# Patient Record
Sex: FEMALE | Race: WHITE | Employment: FULL TIME | URBAN - METROPOLITAN AREA
[De-identification: names, ages, dates, MRNs, and addresses within clinical notes are randomized per-mention and may not be internally consistent; named-entity substitution may affect disease eponyms.]

---

## 2017-02-13 ENCOUNTER — DOCUMENTATION ONLY (OUTPATIENT)
Dept: NEUROLOGY | Age: 42
End: 2017-02-13

## 2017-02-13 ENCOUNTER — OFFICE VISIT (OUTPATIENT)
Dept: NEUROLOGY | Age: 42
End: 2017-02-13

## 2017-02-13 VITALS
HEIGHT: 61 IN | WEIGHT: 196 LBS | DIASTOLIC BLOOD PRESSURE: 80 MMHG | BODY MASS INDEX: 37 KG/M2 | SYSTOLIC BLOOD PRESSURE: 132 MMHG | OXYGEN SATURATION: 98 % | HEART RATE: 87 BPM

## 2017-02-13 DIAGNOSIS — R20.0 LEFT ARM NUMBNESS: ICD-10-CM

## 2017-02-13 DIAGNOSIS — M54.2 NECK PAIN: ICD-10-CM

## 2017-02-13 DIAGNOSIS — G43.829 MENSTRUAL MIGRAINE WITHOUT STATUS MIGRAINOSUS, NOT INTRACTABLE: Primary | ICD-10-CM

## 2017-02-13 RX ORDER — LORAZEPAM 1 MG/1
1 TABLET ORAL ONCE
Qty: 2 TAB | Refills: 0 | Status: SHIPPED | OUTPATIENT
Start: 2017-02-13 | End: 2017-02-13

## 2017-02-13 RX ORDER — SUMATRIPTAN 50 MG/1
50 TABLET, FILM COATED ORAL
COMMUNITY

## 2017-02-13 NOTE — PATIENT INSTRUCTIONS
[facility]   Statement to Patients  April 1, 2014      In an effort to ensure the large volume of patient prescription refills is processed in the most efficient and expeditious manner, we are asking our patients to assist us by calling your Pharmacy for all prescription refills, this will include also your  Mail Order Pharmacy. The pharmacy will contact our office electronically to continue the refill process. Please do not wait until the last minute to call your pharmacy. We need at least 48 hours (2days) to fill prescriptions. We also encourage you to call your pharmacy before going to  your prescription to make sure it is ready. With regard to controlled substance prescription refill requests (narcotic refills) that need to be picked up at our office, we ask your cooperation by providing us with at least 72 hours (3days) notice that you will need a refill. We will not refill narcotic prescription refill requests after 4:00pm on any weekday, Monday through Thursday, or after 2:00pm on Fridays, or on the weekends. We encourage everyone to explore another way of getting your prescription refill request processed using [employer], our patient web portal through our electronic medical record system. [employer] is an efficient and effective way to communicate your medication request directly to the office and  downloadable as an johanna on your smart phone . [employer] also features a review functionality that allows you to view your medication list as well as leave messages for your physician. Are you ready to get connected? If so please review the attatched instructions or speak to any of our staff to get you set up right away! Thank you so much for your cooperation. Should you have any questions please contact our Practice Administrator.     The Physicians and Staff,  [facility]

## 2017-02-13 NOTE — LETTER
Dear Theresa Dong MD, Thank you for allowing me to see your patient, Rocael Rapp for a neurological consultation. Please see my impression and recommendations as outlined in my note. Sincerely, Gideon Paez MD 
[facility] at [address] 
 
REFERRED BY: 
Theresa Dong MD 
 
CHIEF COMPLAINT: 
Migraines, left arm numbness, memory issues, anxiety HISTORY OF PRESENT ILLNESS HISTORY PROVIDED BY: 
Patient Rocael Rapp is a 42 y.o. female who I am asked to see in consultation for migraines, left arm numbness, memory issues, and anxiety. She started with migraines in 2008. Two times in the last two months she had left arm numbness and some chest pain for about an hour. No inciting factor for these. PCP referred her here. She notes several times lately she is more anxious when she is in the city[place] She had an episode of confusion when driving. Anxiety is just situational. It is not an everyday occurrence. She was pregnant with her second daughter when she developed migraine. She now notes they are around her period. Migraines are occurring more often. Headache Characterization: throbbing Pain Level: 10/10 Aura: no 
Frequency/Length: 4 per month and stops with meds Location: left side Nausea/Vomiting: N Photophobia: Y Phonophobia: Y Provoking factors: none Relieving factors: lies down, and takes meds Focal neurologic symptoms with headache: no 
 
Meds: 
Prior abortive tx: imitrex Prior preventative tx: none Current abortive tx: sumatriptan 50 mg x 1 and with advil Current preventative tx: none Family Hx of headaches/migraines: no 
 
Social: 
Work:  for [employer] and has stress with this Caffeine:drinks same amount daily Tobacco use: less than pack per day (quit three years and still had them) Sleep habits: sleeps well (averages 7 hours) Exercise: none FH of grandmother with strokes. No history of CT or MRI brain done. She will get massages about once a month. PMH Past Medical History Diagnosis Date  Headache  Liver tumor (benign) 2003 [address] Social History Social History  Marital status:  Spouse name: N/A  
 Number of children: N/A  
 Years of education: N/A Occupational History   Social History Main Topics  Smoking status: Former Smoker Quit date: 5/10/2008  Smokeless tobacco: Never Used  Alcohol use Yes Comment: rarely  Drug use: No  
 Sexual activity: Yes  
  Partners: Male Birth control/ protection: Spermicide Other Topics Concern  None Social History Narrative [facility] Family History Problem Relation Age of Onset  Thyroid Disease Mother  Thyroid Disease Sister ALLERGIES Allergies Allergen Reactions  Sulfa (Sulfonamide Antibiotics) Hives CURRENT MEDS Current Outpatient Prescriptions Medication Sig Dispense Refill  SUMAtriptan (IMITREX) 50 mg tablet Take 50 mg by mouth once as needed for Migraine.  phentermine (ADIPEX-P) 37.5 mg tablet Take 1 Tab by mouth every morning. Take between 7 and 9 am daily. 30 Tab 1  
 guaiFENesin SR (MUCINEX) 600 mg SR tablet Take 1 Tab by mouth two (2) times a day. REVIEW OF SYSTEMS:  
 
Y  N       Y  N  Y  N   Y  N 
  AIDS            Falls    Memory Loss     Shortness of breath Anxiety            Fatigue   Muscle Pain           Skipped beats Chest Pain     Frequent HA   Ms Weakness        Snoring Constipation  Hearing loss   Nause/Vomiting     Stomach Pain Cough           Hepatitis   Neuropathy            Swallowing difficulty Depression   Incontinence   Poor appetite         Vertigo Diarrhea         Joint Pain   Rash                      Visual disturbances Fainting          Leg Swelling   Ringing ears          Weight changes Unable to obtain  ROS due to  mental status change  sedated   intubated PREVIOUS WORKUP IMAGING: None LABS Results for orders placed or performed in visit on 01/14/13 LIPID PANEL Result Value Ref Range Cholesterol, total 156 100 - 199 mg/dL Triglyceride 210 (H) 0 - 149 mg/dL HDL Cholesterol 38 (L) >39 mg/dL VLDL, calculated 42 (H) 5 - 40 mg/dL LDL, calculated 76 0 - 99 mg/dL METABOLIC PANEL, COMPREHENSIVE Result Value Ref Range Glucose 80 65 - 99 mg/dL BUN 12 6 - 20 mg/dL Creatinine 0.68 0.57 - 1.00 mg/dL GFR est non- >59 mL/min/1.73  
 eGFR If  128 >59 mL/min/1.73  
 BUN/Creatinine ratio 18 8 - 20 Sodium 139 134 - 144 mmol/L Potassium 4.0 3.5 - 5.2 mmol/L Chloride 103 97 - 108 mmol/L  
 CO2 21 20 - 32 mmol/L Calcium 9.4 8.7 - 10.2 mg/dL Protein, total 7.3 6.0 - 8.5 g/dL Albumin 4.6 3.5 - 5.5 g/dL GLOBULIN, TOTAL 2.7 1.5 - 4.5 g/dL A-G Ratio 1.7 1.1 - 2.5 Bilirubin, total 0.3 0.0 - 1.2 mg/dL Alk. phosphatase 54 25 - 150 IU/L  
 AST (SGOT) 12 0 - 40 IU/L  
 ALT (SGPT) 13 0 - 32 IU/L  
CBC W/O DIFF Result Value Ref Range WBC 8.7 4.0 - 10.5 x10E3/uL  
 RBC 5.04 3.77 - 5.28 x10E6/uL HGB 15.3 11.1 - 15.9 g/dL HCT 45.3 34.0 - 46.6 % MCV 90 79 - 97 fL  
 MCH 30.4 26.6 - 33.0 pg  
 MCHC 33.8 31.5 - 35.7 g/dL  
 RDW 12.9 12.3 - 15.4 % PLATELET 126 894 - 214 x10E3/uL  
TSH, 3RD GENERATION Result Value Ref Range TSH 1.460 0.450 - 4.500 uIU/mL VITAMIN D, 25 HYDROXY Result Value Ref Range VITAMIN D, 25-HYDROXY 20.3 (L) 30.0 - 100.0 ng/mL PHYSICAL EXAM 
Visit Vitals  /80  Pulse 87  
 Ht 5' 1\" (1.549 m)  Wt 196 lb (88.9 kg)  SpO2 98%  BMI 37.03 kg/m2 General:  Alert, cooperative, no distress. Head:  Normocephalic, without obvious abnormality, atraumatic. Eyes:  Conjunctivae/corneas clear. Pupils equal, round, reactive to light. Extraocular movements intact, VFF, NO papilledema Lungs: 
Heart:   Non labored breathing Regular rate and rhythm, no carotid bruits Abdomen:   Soft, non-distended Extremities: Extremities normal, atraumatic, no cyanosis or edema. Pulses: 2+ and symmetric all extremities. Skin: Skin color, texture, turgor normal. No rashes or lesions. Neurologic:  Gen: Attention normal 
           Language: naming, repetition, fluency normal 
           Memory: intact recent and remote memory Cranial Nerves: 
I: smell Not tested II: visual fields Full to confrontation II: pupils Equal, round, reactive to light II: optic disc No papilledema III,VII: ptosis none III,IV,VI: extraocular muscles  Full ROM V: mastication normal  
V: facial light touch sensation  normal  
VII: facial muscle function   symmetric VIII: hearing symmetric IX: soft palate elevation  normal  
XI: trapezius strength  5/5 XI: sternocleidomastoid strength 5/5 XI: neck flexion strength  5/5 XII: tongue  midline Motor: normal bulk and tone, no tremor Strength: 5/5 all four extremities Sensory: intact to LT, PP, vibration, and temperature Coordination: FTN intact, Rhomberg negative Gait: normal gait including tandem Reflexes: 2+ throughout IMPRESSION Sharlot Lundborg is a 42 y.o. female who presents for evaluation of migraines, left arm numbness, memory loss, and anxiety. Migraines are becoming more frequent. Given her also additional left arm numbness I would like to do MRI for structural etiology in the brain and C-spine. Will continue current abortive. Discussed preventative the patient was to wait on this for now. I suspect her memory loss/anxiety is situational and not contributing to her current symptoms. RECOMMENDATIONS 1. MRI brain with and without contrast 
2. MRI cervical spine with and without contrast 
3. Ativan for MRI 4.  Continue abortive with sumatriptan 50 mg. Advised patient she can take 100 mg at the time. Encouraged her to continue Advil with this 5. Discussed preventative options. Discussed doing Topamax extended release if needed. 6.  Encouraged patient to try magnesium and riboflavin supplements 7. Headache book given 8. Encourage migraine diary FU 6 months Festus Mock MD 
 
CC: Liam Blood MD 
Fax: [phone] This note was created using voice recognition software. Despite editing, there may be syntax errors. This note will not be viewable in [address]

## 2017-02-13 NOTE — PROGRESS NOTES
NEUROLOGY NEW PATIENT CONSULTATION    REFERRED BY:  Main Canchola MD    CHIEF COMPLAINT:  Migraines, left arm numbness, memory issues, anxiety    HISTORY OF PRESENT ILLNESS    HISTORY PROVIDED BY:  Patient      Cary Arshad is a 42 y.o. female who I am asked to see in consultation for migraines, left arm numbness, memory issues, and anxiety. She started with migraines in 2008. Two times in the last two months she had left arm numbness and some chest pain for about an hour. No inciting factor for these. PCP referred her here. She notes several times lately she is more anxious when she is in the city[place] She had an episode of confusion when driving. Anxiety is just situational. It is not an everyday occurrence. She was pregnant with her second daughter when she developed migraine. She now notes they are around her period. Migraines are occurring more often. Headache Characterization: throbbing  Pain Level: 10/10  Aura: no  Frequency/Length: 4 per month and stops with meds  Location: left side  Nausea/Vomiting: N  Photophobia: Y  Phonophobia: Y  Provoking factors: none  Relieving factors: lies down, and takes meds  Focal neurologic symptoms with headache: no    Meds:  Prior abortive tx: imitrex  Prior preventative tx: none    Current abortive tx: sumatriptan 50 mg x 1 and with advil  Current preventative tx: none    Family Hx of headaches/migraines: no    Social:  Work:  for [employer] and has stress with this  Caffeine:drinks same amount daily  Tobacco use: less than pack per day (quit three years and still had them)  Sleep habits: sleeps well (averages 7 hours)  Exercise: none    FH of grandmother with strokes. No history of CT or MRI brain done. She will get massages about once a month.        PMH  Past Medical History   Diagnosis Date    Headache     Liver tumor (benign) 2003 31 [facility]  Social History     Social History    Marital status:  Spouse name: N/A    Number of children: N/A    Years of education: N/A     Occupational History          Social History Main Topics    Smoking status: Former Smoker     Quit date: 5/10/2008    Smokeless tobacco: Never Used    Alcohol use Yes      Comment: rarely    Drug use: No    Sexual activity: Yes     Partners: Male     Birth control/ protection: Spermicide     Other Topics Concern    None     Social History Narrative       FH  Family History   Problem Relation Age of Onset    Thyroid Disease Mother     Thyroid Disease Sister        ALLERGIES  Allergies   Allergen Reactions    Sulfa (Sulfonamide Antibiotics) Hives       CURRENT MEDS  Current Outpatient Prescriptions   Medication Sig Dispense Refill    SUMAtriptan (IMITREX) 50 mg tablet Take 50 mg by mouth once as needed for Migraine.  phentermine (ADIPEX-P) 37.5 mg tablet Take 1 Tab by mouth every morning. Take between 7 and 9 am daily. 30 Tab 1    guaiFENesin SR (MUCINEX) 600 mg SR tablet Take 1 Tab by mouth two (2) times a day.          REVIEW OF SYSTEMS:     Y  N       Y  N  Y  N   Y  N  [] [x] AIDS          [] [x] Falls  [x] [] Memory Loss  [] [x]  Shortness of breath  [x] [] Anxiety          [x] [] Fatigue [] [x] Muscle Pain        [] [x]  Skipped beats  [] [x] Chest Pain   [x] [] Frequent HA [] [x] Ms Weakness     [] [x]  Snoring  [x] [] Constipation [] [x]Hearing loss [] [x] Nause/Vomiting  [] [x]  Stomach Pain  [x] [] Cough          [] [x]Hepatitis [] [x] Neuropathy         [] [x]  Swallowing difficulty  [] [x] Depression  [] [x]Incontinence [] [x] Poor appetite      [] [x]  Vertigo  [] [x] Diarrhea       [x] [x] Joint Pain [] [x] Rash                   [] [x]  Visual disturbances  [] [x] Fainting        [] [x] Leg Swelling [] [x] Ringing ears       [] [x]  Weight changes      []Unable to obtain  ROS due to  []mental status change  []sedated   []intubated          PREVIOUS WORKUP  IMAGING: None      LABS  Results for orders placed or performed in visit on 01/14/13   LIPID PANEL   Result Value Ref Range    Cholesterol, total 156 100 - 199 mg/dL    Triglyceride 210 (H) 0 - 149 mg/dL    HDL Cholesterol 38 (L) >39 mg/dL    VLDL, calculated 42 (H) 5 - 40 mg/dL    LDL, calculated 76 0 - 99 mg/dL   METABOLIC PANEL, COMPREHENSIVE   Result Value Ref Range    Glucose 80 65 - 99 mg/dL    BUN 12 6 - 20 mg/dL    Creatinine 0.68 0.57 - 1.00 mg/dL    GFR est non- >59 mL/min/1.73    eGFR If  128 >59 mL/min/1.73    BUN/Creatinine ratio 18 8 - 20    Sodium 139 134 - 144 mmol/L    Potassium 4.0 3.5 - 5.2 mmol/L    Chloride 103 97 - 108 mmol/L    CO2 21 20 - 32 mmol/L    Calcium 9.4 8.7 - 10.2 mg/dL    Protein, total 7.3 6.0 - 8.5 g/dL    Albumin 4.6 3.5 - 5.5 g/dL    GLOBULIN, TOTAL 2.7 1.5 - 4.5 g/dL    A-G Ratio 1.7 1.1 - 2.5    Bilirubin, total 0.3 0.0 - 1.2 mg/dL    Alk. phosphatase 54 25 - 150 IU/L    AST (SGOT) 12 0 - 40 IU/L    ALT (SGPT) 13 0 - 32 IU/L   CBC W/O DIFF   Result Value Ref Range    WBC 8.7 4.0 - 10.5 x10E3/uL    RBC 5.04 3.77 - 5.28 x10E6/uL    HGB 15.3 11.1 - 15.9 g/dL    HCT 45.3 34.0 - 46.6 %    MCV 90 79 - 97 fL    MCH 30.4 26.6 - 33.0 pg    MCHC 33.8 31.5 - 35.7 g/dL    RDW 12.9 12.3 - 15.4 %    PLATELET 882 646 - 860 x10E3/uL   TSH, 3RD GENERATION   Result Value Ref Range    TSH 1.460 0.450 - 4.500 uIU/mL   VITAMIN D, 25 HYDROXY   Result Value Ref Range    VITAMIN D, 25-HYDROXY 20.3 (L) 30.0 - 100.0 ng/mL       PHYSICAL EXAM  Visit Vitals    /80    Pulse 87    Ht 5' 1\" (1.549 m)    Wt 196 lb (88.9 kg)    SpO2 98%    BMI 37.03 kg/m2     General:  Alert, cooperative, no distress. Head:  Normocephalic, without obvious abnormality, atraumatic. Eyes:  Conjunctivae/corneas clear. Pupils equal, round, reactive to light.  Extraocular movements intact, VFF, NO papilledema   Lungs:  Heart:   Non labored breathing  Regular rate and rhythm, no carotid bruits   Abdomen:   Soft, non-distended Extremities: Extremities normal, atraumatic, no cyanosis or edema. Pulses: 2+ and symmetric all extremities. Skin: Skin color, texture, turgor normal. No rashes or lesions. Neurologic:  Gen: Attention normal             Language: naming, repetition, fluency normal             Memory: intact recent and remote memory  Cranial Nerves:  I: smell Not tested   II: visual fields Full to confrontation   II: pupils Equal, round, reactive to light   II: optic disc No papilledema   III,VII: ptosis none   III,IV,VI: extraocular muscles  Full ROM   V: mastication normal   V: facial light touch sensation  normal   VII: facial muscle function   symmetric   VIII: hearing symmetric   IX: soft palate elevation  normal   XI: trapezius strength  5/5   XI: sternocleidomastoid strength 5/5   XI: neck flexion strength  5/5   XII: tongue  midline     Motor: normal bulk and tone, no tremor              Strength: 5/5 all four extremities  Sensory: intact to LT, PP, vibration, and temperature  Coordination: FTN intact, Rhomberg negative  Gait: normal gait including tandem   Reflexes: 2+ throughout       IMPRESSION  Divina Aguillon is a 42 y.o. female who presents for evaluation of migraines, left arm numbness, memory loss, and anxiety. Migraines are becoming more frequent. Given her also additional left arm numbness I would like to do MRI for structural etiology in the brain and C-spine. Will continue current abortive. Discussed preventative the patient was to wait on this for now. I suspect her memory loss/anxiety is situational and not contributing to her current symptoms. RECOMMENDATIONS  1. MRI brain with and without contrast  2. MRI cervical spine with and without contrast  3. Ativan for MRI  4. Continue abortive with sumatriptan 50 mg. Advised patient she can take 100 mg at the time. Encouraged her to continue Advil with this  5. Discussed preventative options. Discussed doing Topamax extended release if needed.   6. Encouraged patient to try magnesium and riboflavin supplements  7. Headache book given  8. Encourage migraine diary    FU 6 months    Avinash Hudson MD    CC: Cooper Angulo MD  Fax: [phone]    This note was created using voice recognition software. Despite editing, there may be syntax errors. This note will not be viewable in [address]

## 2017-02-13 NOTE — MR AVS SNAPSHOT
Visit Information Date & Time Provider Department Dept. Phone Encounter #  
 2/13/2017 10:00 AM Irene Wong MD [facility] [phone] [id number] Upcoming Health Maintenance Date Due DTaP/Tdap/Td series (1 - Tdap) 9/4/1995 PAP AKA CERVICAL CYTOLOGY 5/31/2014 INFLUENZA AGE 9 TO ADULT 8/1/2016 Allergies as of 2/13/2017  Review Complete On: 2/25/2013 By: Leah Houston Severity Noted Reaction Type Reactions Sulfa (Sulfonamide Antibiotics)  12/03/2010    Hives Current Immunizations  Reviewed on 1/14/2013 Name Date RHOGAM INJECTION 1/31/2012  1:00 PM  
  
 Not reviewed this visit You Were Diagnosed With   
  
 Codes Comments Neck pain    -  Primary ICD-10-CM: M54.2 ICD-9-CM: 723.1 Left arm numbness     ICD-10-CM: R20.0 ICD-9-CM: 782.0 Menstrual migraine without status migrainosus, not intractable     ICD-10-CM: F08.451 ICD-9-CM: 346.40 Vitals BP Pulse Height(growth percentile) Weight(growth percentile) SpO2 BMI  
 132/80 87 5' 1\" (1.549 m) 196 lb (88.9 kg) 98% 37.03 kg/m2 OB Status Smoking Status Having regular periods Former Smoker Vitals History BMI and BSA Data Body Mass Index Body Surface Area  
 37.03 kg/m 2 1.96 m 2 Preferred Pharmacy Pharmacy Name Phone [facility], [address] [place] [address] [phone] Your Updated Medication List  
  
   
This list is accurate as of: 2/13/17 10:33 AM.  Always use your most recent med list.  
  
  
  
  
 guaiFENesin  mg SR tablet Commonly known as:  Rafa & Rafa Take 1 Tab by mouth two (2) times a day. LORazepam 1 mg tablet Commonly known as:  ATIVAN Take 1 Tab by mouth once for 1 dose. Max Daily Amount: 2 mg. phentermine 37.5 mg tablet Commonly known as:  ADIPEX-P Take 1 Tab by mouth every morning. Take between 7 and 9 am daily. SUMAtriptan 50 mg tablet Commonly known as:  IMITREX Take 50 mg by mouth once as needed for Migraine. Prescriptions Printed Refills LORazepam (ATIVAN) 1 mg tablet 0 Sig: Take 1 Tab by mouth once for 1 dose. Max Daily Amount: 2 mg. Class: Print Route: Oral  
  
To-Do List   
 02/14/2017 Imaging:  MRI BRAIN W WO CONT   
  
 02/14/2017 Imaging:  MRI CERV SPINE W WO CONT Patient Instructions PRESCRIPTION REFILL POLICY [facility] Statement to Patients April 1, 2014 In an effort to ensure the large volume of patient prescription refills is processed in the most efficient and expeditious manner, we are asking our patients to assist us by calling your Pharmacy for all prescription refills, this will include also your  Mail Order Pharmacy. The pharmacy will contact our office electronically to continue the refill process. Please do not wait until the last minute to call your pharmacy. We need at least 48 hours (2days) to fill prescriptions. We also encourage you to call your pharmacy before going to  your prescription to make sure it is ready. With regard to controlled substance prescription refill requests (narcotic refills) that need to be picked up at our office, we ask your cooperation by providing us with at least 72 hours (3days) notice that you will need a refill. We will not refill narcotic prescription refill requests after 4:00pm on any weekday, Monday through Thursday, or after 2:00pm on Fridays, or on the weekends. We encourage everyone to explore another way of getting your prescription refill request processed using [employer], our patient web portal through our electronic medical record system. [employer] is an efficient and effective way to communicate your medication request directly to the office and  downloadable as an johanna on your smart phone .  [employer] also features a review functionality that allows you to view your medication list as well as leave messages for your physician. Are you ready to get connected? If so please review the attatched instructions or speak to any of our staff to get you set up right away! Thank you so much for your cooperation. Should you have any questions please contact our Practice Administrator. The Physicians and Staff,  [facility] Introducing [facility] SERVICES! [facility] introduces [employer] patient portal. Now you can access parts of your medical record, email your doctor's office, and request medication refills online. 1. In your internet browser, go to [email or web address] 2. Click on the First Time User? Click Here link in the Sign In box. You will see the New Member Sign Up page. 3. Enter your [employer] Access Code exactly as it appears below. You will not need to use this code after youve completed the sign-up process. If you do not sign up before the expiration date, you must request a new code. · [employer] Access Code: [email or web address] Expires: 5/14/2017  9:22 AM 
 
4. Enter the last four digits of your Social Security Number (xxxx) and Date of Birth (mm/dd/yyyy) as indicated and click Submit. You will be taken to the next sign-up page. 5. Create a [employer] ID. This will be your [employer] login ID and cannot be changed, so think of one that is secure and easy to remember. 6. Create a [employer] password. You can change your password at any time. 7. Enter your Password Reset Question and Answer. This can be used at a later time if you forget your password. 8. Enter your e-mail address. You will receive e-mail notification when new information is available in [address] 9. Click Sign Up. You can now view and download portions of your medical record. 10. Click the Download Summary menu link to download a portable copy of your medical information. If you have questions, please visit the Frequently Asked Questions section of the [employer] website. Remember, [employer] is NOT to be used for urgent needs. For medical emergencies, dial 911. Now available from your iPhone and Android! Please provide this summary of care documentation to your next provider. Your primary care clinician is listed as Kimber Escobar. If you have any questions after today's visit, please call [phone].

## 2017-03-06 ENCOUNTER — APPOINTMENT (OUTPATIENT)
Dept: MRI IMAGING | Age: 42
End: 2017-03-06
Attending: PSYCHIATRY & NEUROLOGY
Payer: COMMERCIAL

## 2017-03-06 ENCOUNTER — HOSPITAL ENCOUNTER (OUTPATIENT)
Dept: MRI IMAGING | Age: 42
Discharge: HOME | End: 2017-03-06
Attending: PSYCHIATRY & NEUROLOGY
Payer: COMMERCIAL

## 2017-03-06 DIAGNOSIS — M54.2 NECK PAIN: ICD-10-CM

## 2017-03-06 DIAGNOSIS — R20.0 LEFT ARM NUMBNESS: ICD-10-CM

## 2017-03-06 DIAGNOSIS — G43.829 MENSTRUAL MIGRAINE WITHOUT STATUS MIGRAINOSUS, NOT INTRACTABLE: ICD-10-CM

## 2017-03-06 PROCEDURE — 74011250636 HC RX REV CODE- 250/636: Performed by: PSYCHIATRY & NEUROLOGY

## 2017-03-06 PROCEDURE — 70553 MRI BRAIN STEM W/O & W/DYE: CPT

## 2017-03-06 PROCEDURE — A9585 GADOBUTROL INJECTION: HCPCS | Performed by: PSYCHIATRY & NEUROLOGY

## 2017-03-06 RX ADMIN — GADOBUTROL 9 ML: 604.72 INJECTION INTRAVENOUS at 16:07

## 2019-12-31 ENCOUNTER — OFFICE VISIT (OUTPATIENT)
Dept: ONCOLOGY | Age: 44
End: 2019-12-31

## 2019-12-31 ENCOUNTER — DOCUMENTATION ONLY (OUTPATIENT)
Dept: ONCOLOGY | Age: 44
End: 2019-12-31

## 2019-12-31 VITALS
DIASTOLIC BLOOD PRESSURE: 96 MMHG | TEMPERATURE: 97.4 F | HEART RATE: 76 BPM | HEIGHT: 61 IN | SYSTOLIC BLOOD PRESSURE: 140 MMHG | OXYGEN SATURATION: 96 % | RESPIRATION RATE: 18 BRPM | WEIGHT: 214.8 LBS | BODY MASS INDEX: 40.55 KG/M2

## 2019-12-31 DIAGNOSIS — C50.112 MALIGNANT NEOPLASM OF CENTRAL PORTION OF LEFT BREAST IN FEMALE, ESTROGEN RECEPTOR NEGATIVE (HCC): Primary | ICD-10-CM

## 2019-12-31 DIAGNOSIS — Z17.1 MALIGNANT NEOPLASM OF CENTRAL PORTION OF LEFT BREAST IN FEMALE, ESTROGEN RECEPTOR NEGATIVE (HCC): Primary | ICD-10-CM

## 2019-12-31 RX ORDER — ESCITALOPRAM OXALATE 5 MG/1
TABLET ORAL DAILY
COMMUNITY

## 2019-12-31 NOTE — PATIENT INSTRUCTIONS
Common Side Effects of Chemotherapy  Decreased Blood Counts Your blood counts can decrease temporarily due to chemotherapy, they will recover over time. This is an expected side effect that your Doctor will be monitoring.  - If you experience fevers (temperature >100.4°F), bleeding or unexplained bruising, please call the office right away   Risk of Infection Your white blood cells can decrease temporarily due to chemotherapy and can put you at higher risk of infection. Washing hands frequently with soap and avoiding sick contacts can reduce your risk of infection.  - If you experience fevers (temperature >100.4°F), shaking chills, or any signs of infection, please call the office immediately   Anemia Chemotherapy can cause your red blood cells to temporarily decrease; this is an expected side effect that your Doctor will be monitoring.  - You may experience fatigue if this occurs, please notify the office if you experience bleeding, shortness of breath with minimal exertion or at rest, rapid heartbeat, or feeling as though you may lose consciousness. Hair Loss Chemotherapy can affect your hair follicles and cause you to lose hair. This can occur on your scalp hair but also all over your body including eyebrows and eye lashes   Nausea  You have been prescribed nausea medication to take if needed. Please follow the directions given to you by your Doctor. - Please call the office if the medications you have been given are not relieving nausea. Vomiting Make sure you are taking anti-nausea medication as prescribed. Eating small amounts of bland foods frequently can help. - Please call the office right away if you are vomiting more than 4 times per day or are unable to keep down food or fluids   Diarrhea Eating small amounts of bland foods frequently can help, increase your fluid intake. It is usually ok to take Imodium for diarrhea.   - Please call the office right away if you experience more than 4 episodes of watery diarrhea or if you are feeling dehydrated. Female patients of childbearing age need to avoid pregnancy during chemotherapy. You can reach Medical Oncology at [phone] with further questions or concerns at: [phone].  - Calls during normal business hours will reach our office.  - Calls after hours or on the weekend will reach an answering service and the on-call Oncologist will return your call. Prognosis: Good     This is our best current assessment. Cancers respond differently to treatment. Overall prognosis depends on many factors including other conditions, cancer stage, side effects, and other unforeseen events. Goal of therapy: Curative     Expected response to treatment:  Very good: Anticipate remission (no sign of cancer) and possible cancer cure    Treatment benefits and harms:  We discussed potential short term side effects to include:see handout    Long term side effects of treatment:  see handout    Quality of life: Quality of life concerns have been addressed. Treatment as outlined is expected to have minimal impact on patients quality of life.

## 2019-12-31 NOTE — PROGRESS NOTES
[facility] [place] [facility] [place] [address] [facility]  Marian Grijalva: [phone]  F: [phone]      Reason for Visit:   Marilynn Caban is a 38 y.o. female who is seen in consultation at the request of Dr. Maninder Dutta for evaluation of therapy for breast cancer (he called and asked if I could see her)    Surgeon:  Dr. Mirian Patrick surgeon:  Dr. Stephanie Pollard    Treatment History:   · 10/2/19 left breast core bx:  DCIS gr 2-3, ER negative, IN negative  · 19 left breast lumpectomy:  IDC, gr 3, 8 mm, ER negative, IN negative, HER 2 negative at IHC 0, Ki-67 60%, 2 mm DCIS, gr 3, 0/3 LN, pT1b pN0 cM0  · [employer] genetic testing:  negative    History of Present Illness: An abnormal mammogram led to the pathology above. Pt has a right breast infection, on keflex    FH:  Paternal Great aunts (two of them), 53-62 and 64-70 years old; maternal great aunt in her 76s; no ovarian, advanced prostate cancer, or pancreas cancer    LMP 19    Past Medical History:   Diagnosis Date    Headache     Liver tumor (benign)       Past Surgical History:   Procedure Laterality Date    HX  SECTION      x 2    IN HISTORY NEW OR CHANGING MOLES      pre-cancerous       Social History     Tobacco Use    Smoking status: Former Smoker     Last attempt to quit: 5/10/2008     Years since quittin.6    Smokeless tobacco: Never Used   Substance Use Topics    Alcohol use: Yes     Comment: rarely      Family History   Problem Relation Age of Onset    Thyroid Disease Mother     Thyroid Disease Sister      Current Outpatient Medications   Medication Sig    escitalopram oxalate (LEXAPRO) 5 mg tablet Take  by mouth daily.  cephalexin (KEFLEX PO) Take  by mouth.  SUMAtriptan (IMITREX) 50 mg tablet Take 50 mg by mouth once as needed for Migraine.  topiramate ER (TROKENDI XR) 25 mg capsule Take 1 Cap by mouth daily.     topiramate ER (TROKENDI XR) 50 mg capsule Take 1 Cap by mouth daily.  phentermine (ADIPEX-P) 37.5 mg tablet Take 1 Tab by mouth every morning. Take between 7 and 9 am daily.  guaiFENesin SR (MUCINEX) 600 mg SR tablet Take 1 Tab by mouth two (2) times a day. No current facility-administered medications for this visit. Allergies   Allergen Reactions    Sulfa (Sulfonamide Antibiotics) Hives        Review of Systems: A complete review of systems was obtained, negative except as described above. Physical Exam:     Visit Vitals  BP (!) 140/96 (BP 1 Location: Left arm, BP Patient Position: Sitting)   Pulse 76   Temp 97.4 °F (36.3 °C) (Temporal)   Resp 18   Ht 5' 1\" (1.549 m)   Wt 214 lb 12.8 oz (97.4 kg)   SpO2 96%   BMI 40.59 kg/m²     ECOG PS: 0  General: No distress  Eyes: PERRLA, anicteric sclerae  HENT: Atraumatic, OP clear  Neck: Supple  Lymphatic: No cervical, supraclavicular adenopathy  Respiratory: CTAB, normal respiratory effort  CV: Normal rate, regular rhythm, no murmurs, no peripheral edema  GI: Soft, nontender, nondistended, no masses, no hepatomegaly, no splenomegaly; + BS  MS:  Digits without clubbing or cyanosis. Skin: No rashes, ecchymoses, or petechiae. Normal temperature, turgor, and texture.   Psych: Alert, oriented, appropriate affect, normal judgment/insight    Breasts:  Right breast with open would, bleeding, no erythema    Results:     Lab Results   Component Value Date/Time    WBC 8.7 01/14/2013 12:46 PM    HGB 15.3 01/14/2013 12:46 PM    HCT 45.3 01/14/2013 12:46 PM    PLATELET 860 70/15/8553 12:46 PM    MCV 90 01/14/2013 12:46 PM     Lab Results   Component Value Date/Time    Sodium 139 01/14/2013 12:46 PM    Potassium 4.0 01/14/2013 12:46 PM    Chloride 103 01/14/2013 12:46 PM    CO2 21 01/14/2013 12:46 PM    Glucose 80 01/14/2013 12:46 PM    BUN 12 01/14/2013 12:46 PM    Creatinine 0.68 01/14/2013 12:46 PM    GFR est  05/31/2011 03:48 PM    GFR est non- 01/14/2013 12:46 PM    Calcium 9.4 01/14/2013 12:46 PM     Lab Results   Component Value Date/Time    Bilirubin, total 0.3 01/14/2013 12:46 PM    ALT (SGPT) 13 01/14/2013 12:46 PM    AST (SGOT) 12 01/14/2013 12:46 PM    Alk. phosphatase 54 01/14/2013 12:46 PM    Protein, total 7.3 01/14/2013 12:46 PM    Albumin 4.6 01/14/2013 12:46 PM     10/17/19 breast MRI  DCIS at 12:00 8 cmfn      Records reviewed and summarized above. Pathology report(s) reviewed above. Radiology report(s) reviewed above. Assessment/plan:   1. 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 We explained why a patient can have microscopic cancer spread now even though physical examination, laboratory studies and imaging studies are negative for cancer. We explained that the same treatments used now as adjuvant or preventive treatments rarely if ever are curative in women who develop metastases. I discussed the potential risks of dose-dense chemotherapy with the patient. (DD AC-T, adriamycin 60 mg/m2; cyclophosphamide 600 mg/m2 q 2 weeks x 4; paclitaxel 80 mg/m2 qweekly x 12). Major toxicities include nausea and vomiting, stomatitis, fatigue, and a small risk of heart damage. Anemia frequently results and occasionally requires growth factors and rarely transfusions. Neutropenic fever is uncommon, but can be a life-threatening problem. Also, there is a small but increased risk of myelodysplasia and acute leukemia. We provided the patient with detailed information concerning toxicity including frequent toxicities that only last a few days, such as nausea, vomiting, mouth sores, arthralgia, myalgia, and potentially allergic reactions to paclitaxel, as well as toxicities which can be longer lasting including total alopecia, fatigue, anemia and neuropathy.  We provided the patient with detailed information concerning the toxicities of their regimen in addition to our verbal discussion. We discussed the toxicities of TC chemotherapy (docetaxel 75mg/m2/cyclophosphamide 600 mg/m2 q 3 weeks x 4)  in detail. This chemotherapy frequently causes a low white blood cell count and a small percent of patients require hospital admission for treatment of neutropenic fever. We explained that on occasion we consider the use of growth factors to minimize this risk. We explained to the patient that some side effects, if they occur, only last a few days including nausea, vomiting, stomatitis, arthralgia, myalgia, and allergic reactions to Taxotere. We told the patient that severe nausea and vomiting were very uncommon and that some side effects, if they occur, will last longer: this includes hair loss, which will be seen in all patients treated with these agents and fatigue, which will be seen in most. The patient was also told that if she is having menstrual function that she could develop chemotherapy-induced amenorrhea and infertility. We also told the patient that there was a very small risk of acute leukemia. We also informed the patient that heart damage is rare with these agents    Would use neulasta for TC. Using Predict!, there is a 2% OS difference between 2nd and 3rd gen chemo. OS benefit of chemo is 5-6%. For her, I recommend TC q 3 weeks x 4. Discussed cold caps and she is interested. Also discussed oral and peripheral cryotherapy. She states she would want a port with Dr. Demarcus Amezquita if she decides yes on chemo. If she decides to receive chemo, she would like to be completed by April. If we started on 1/21, the dates after would be 2/11, 3/4, and 3/25. However, this assumes the wound has healed, which it may not. Data show that for [place] breast cancer, chemotherapy is most effective given near 4 weeks after surgery. After 12 weeks, I would not recommend chemotherapy.   Thus, we may need to take a calculated risk with her wound healing if it is not completely closed in a reasonable amount of time. She will take this week and consider her options and let me know her decision. I let her know that for radiation, she may be treated close to home and for chemotherapy, there also may be options (such as [address]) that are closer to her as well. 2. Emotional well being:  She has excellent support and is coping well with her disease    3. tob use:  Has quit    4. Right breast infection:  On keflex; followed by Dr. Vicki Manning; will need to see healing prior to chemotherapy starting    5. Shawn John age at diagnosis:  Genetic testing negative    > 80 min were spent with this patient with > 50% of that time spent in face to face counseling. I appreciate the opportunity to participate in Ms. [facility]. Signed By: Cornelius Hunt MD      No orders of the defined types were placed in this encounter.

## 2019-12-31 NOTE — PROGRESS NOTES
12/31/19 3:01 PM:  Provided patient with a chemotherapy education packet including a handout regarding breast cancer stages printed from cancer. net, a handout regarding TC chemotherapy regimen, a handout on common side effects of chemotherapy, and a handout on how to safely handle bodily fluids at home after chemotherapy. [place]man cooling caps were also discussed. RN reviewed packet with the patient; opportunity was provided for questions and concerns.

## 2020-01-03 ENCOUNTER — TELEPHONE (OUTPATIENT)
Dept: ONCOLOGY | Age: 45
End: 2020-01-03

## 2020-01-03 NOTE — TELEPHONE ENCOUNTER
1/3/20 1:55 PM: Per request of Dr. Reji Johnson, called patient to check on her and see if she has made any decisions about treatment. Patient stated she has not made any decisions yet. Patient stated that she has an appointment with Dr. Neyda Mchugh on 1/7 and wanted to discuss with him before making any decisions. Patient stated she will call this office back after that appointment. Denied questions or concerns at this time.

## 2020-02-05 ENCOUNTER — OFFICE VISIT (OUTPATIENT)
Dept: ONCOLOGY | Age: 45
End: 2020-02-05

## 2020-02-05 VITALS
OXYGEN SATURATION: 96 % | TEMPERATURE: 98.3 F | WEIGHT: 223.2 LBS | HEIGHT: 61 IN | HEART RATE: 88 BPM | RESPIRATION RATE: 18 BRPM | DIASTOLIC BLOOD PRESSURE: 75 MMHG | BODY MASS INDEX: 42.14 KG/M2 | SYSTOLIC BLOOD PRESSURE: 116 MMHG

## 2020-02-05 DIAGNOSIS — E66.01 OBESITY, MORBID (HCC): ICD-10-CM

## 2020-02-05 NOTE — PROGRESS NOTES
[facility] [place] at [address] [facility]  [place]: [phone]  F: [phone]      Reason for Visit:   Shakira Garcia is a 38 y.o. female who is seen in consultation at the request of Dr. Mauricio Hitchcock for evaluation of therapy for breast cancer (he called and asked if I could see her)    Surgeon:  Dr. Gilda Ernandez surgeon:  Dr. David Kirby    Treatment History:   · 10/2/19 left breast core bx:  DCIS gr 2-3, ER negative, ME negative  · 19 left breast lumpectomy:  IDC, gr 3, 8 mm, ER negative, ME negative, HER 2 negative at IHC 0, Ki-67 60%, 2 mm DCIS, gr 3, 0/3 LN, pT1b pN0 cM0  · [employer] genetic testing:  negative    History of Present Illness: An abnormal mammogram led to the pathology above. Interval history:  In today for follow up. Complains of gr 1 bleeding, gr 1 fatigue, gr 1 pain, 2/10 right breast.  Reports her breast wound is still open. FH:  Paternal Great aunts (two of them), 53-62 and 64-70 years old; maternal great aunt in her 76s; no ovarian, advanced prostate cancer, or pancreas cancer    LMP 19    Past Medical History:   Diagnosis Date    Headache     Liver tumor (benign)       Past Surgical History:   Procedure Laterality Date    HX  SECTION      x 2    ME HISTORY NEW OR CHANGING MOLES      pre-cancerous       Social History     Tobacco Use    Smoking status: Former Smoker     Last attempt to quit: 5/10/2008     Years since quittin.7    Smokeless tobacco: Never Used   Substance Use Topics    Alcohol use: Yes     Comment: rarely      Family History   Problem Relation Age of Onset    Thyroid Disease Mother     Thyroid Disease Sister      Current Outpatient Medications   Medication Sig    escitalopram oxalate (LEXAPRO) 5 mg tablet Take  by mouth daily.  SUMAtriptan (IMITREX) 50 mg tablet Take 50 mg by mouth once as needed for Migraine.  cephalexin (KEFLEX PO) Take  by mouth.     topiramate ER (TROKENDI XR) 25 mg capsule Take 1 Cap by mouth daily.  topiramate ER (TROKENDI XR) 50 mg capsule Take 1 Cap by mouth daily.  phentermine (ADIPEX-P) 37.5 mg tablet Take 1 Tab by mouth every morning. Take between 7 and 9 am daily.  guaiFENesin SR (MUCINEX) 600 mg SR tablet Take 1 Tab by mouth two (2) times a day. No current facility-administered medications for this visit. Allergies   Allergen Reactions    Sulfa (Sulfonamide Antibiotics) Hives        Review of Systems: A complete review of systems was obtained, negative except as described above. Physical Exam:     Visit Vitals  /75 (BP 1 Location: Left arm, BP Patient Position: Sitting)   Pulse 88   Temp 98.3 °F (36.8 °C) (Temporal)   Resp 18   Ht 5' 1\" (1.549 m)   Wt 223 lb 3.2 oz (101.2 kg)   SpO2 96%   BMI 42.17 kg/m²     ECOG PS: 0  General: No distress  Respiratory: Normal respiratory effort  CV: No peripheral edema  Skin: No rashes, ecchymoses, or petechiae  Psych: Alert, oriented, normal mood/affect      Breasts:  Right breast with open would, bleeding, no erythema; healthy pink tissue, no necrotic tissue    Results:     Lab Results   Component Value Date/Time    WBC 8.7 01/14/2013 12:46 PM    HGB 15.3 01/14/2013 12:46 PM    HCT 45.3 01/14/2013 12:46 PM    PLATELET 241 48/55/0305 12:46 PM    MCV 90 01/14/2013 12:46 PM     Lab Results   Component Value Date/Time    Sodium 139 01/14/2013 12:46 PM    Potassium 4.0 01/14/2013 12:46 PM    Chloride 103 01/14/2013 12:46 PM    CO2 21 01/14/2013 12:46 PM    Glucose 80 01/14/2013 12:46 PM    BUN 12 01/14/2013 12:46 PM    Creatinine 0.68 01/14/2013 12:46 PM    GFR est  05/31/2011 03:48 PM    GFR est non- 01/14/2013 12:46 PM    Calcium 9.4 01/14/2013 12:46 PM     Lab Results   Component Value Date/Time    Bilirubin, total 0.3 01/14/2013 12:46 PM    ALT (SGPT) 13 01/14/2013 12:46 PM    AST (SGOT) 12 01/14/2013 12:46 PM    Alk.  phosphatase 54 01/14/2013 12:46 PM    Protein, total 7.3 01/14/2013 12:46 PM    Albumin 4.6 01/14/2013 12:46 PM     10/17/19 breast MRI  [facility] 12:00 [facility]      Records reviewed and summarized above. Pathology report(s) reviewed above. Radiology report(s) reviewed above. Assessment/plan:   1. 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 We explained why a patient can have microscopic cancer spread now even though physical examination, laboratory studies and imaging studies are negative for cancer. We explained that the same treatments used now as adjuvant or preventive treatments rarely if ever are curative in women who develop metastases. I discussed the potential risks of dose-dense chemotherapy with the patient. (DD AC-T, adriamycin 60 mg/m2; cyclophosphamide 600 mg/m2 q 2 weeks x 4; paclitaxel 80 mg/m2 qweekly x 12). Major toxicities include nausea and vomiting, stomatitis, fatigue, and a small risk of heart damage. Anemia frequently results and occasionally requires growth factors and rarely transfusions. Neutropenic fever is uncommon, but can be a life-threatening problem. Also, there is a small but increased risk of myelodysplasia and acute leukemia. We provided the patient with detailed information concerning toxicity including frequent toxicities that only last a few days, such as nausea, vomiting, mouth sores, arthralgia, myalgia, and potentially allergic reactions to paclitaxel, as well as toxicities which can be longer lasting including total alopecia, fatigue, anemia and neuropathy. We provided the patient with detailed information concerning the toxicities of their regimen in addition to our verbal discussion. We discussed the toxicities of TC chemotherapy (docetaxel 75mg/m2/cyclophosphamide 600 mg/m2 q 3 weeks x 4)  in detail.  This chemotherapy frequently causes a low white blood cell count and a small percent of patients require hospital admission for treatment of neutropenic fever. We explained that on occasion we consider the use of growth factors to minimize this risk. We explained to the patient that some side effects, if they occur, only last a few days including nausea, vomiting, stomatitis, arthralgia, myalgia, and allergic reactions to Taxotere. We told the patient that severe nausea and vomiting were very uncommon and that some side effects, if they occur, will last longer: this includes hair loss, which will be seen in all patients treated with these agents and fatigue, which will be seen in most. The patient was also told that if she is having menstrual function that she could develop chemotherapy-induced amenorrhea and infertility. We also told the patient that there was a very small risk of acute leukemia. We also informed the patient that heart damage is rare with these agents    Would use neulasta for TC. Using Predict!, there is a 2% OS difference between 2nd and 3rd gen chemo. OS benefit of chemo is 5-6%. For her, I recommend TC q 3 weeks x 4. Discussed cold caps and she is interested and was fitted today. Also discussed oral and peripheral cryotherapy. She has done her own research and has decided on chemotherapy, but is concerned about missing her window due to her right breast wound. She is aware of the literature reporting improvement in [place] breast cancer survival if chemotherapy is started within 60 days from surgery. The absolute window would be 12 weeks from surgery. 60 days would be 2/18. I will speak to Dr. Torito Chaney next week (he is out this week and she will see him on 2/12. I will also see her on 2/12. If it is reasonable to start chemotherapy, we will proceed with chemo on 2/18. She will tentatively set up a port placement with Dr. Devyn Ordonez between 2/12 and 2/18.     Thus, we may need to take a calculated risk with her wound healing if it is not completely closed in a reasonable amount of time. Will see her back next week to finalize the timing of chemotherapy    2. Emotional well being:  She has excellent support and is coping well with her disease    3. tob use:  Has quit    4. Right breast wound:  No longer infected, healing well, but open wound, see above    5. Diann Buchanan age at diagnosis:  Genetic testing negative    > 40 min were spent with this patient with > 50% of that time spent in face to face counseling. I appreciate the opportunity to participate in Ms. Niranjan lAfred care. Signed By: Margarette Reid MD      No orders of the defined types were placed in this encounter.

## 2020-02-05 NOTE — PROGRESS NOTES
Kae De La Paz is a 38 y.o. female Follow up for the Evaluation for Breast Cancer. 1. Have you been to the ER, urgent care clinic since your last visit? Hospitalized since your last visit? No    2. Have you seen or consulted any other health care providers outside of the [address] [place] [address] since your last visit? Include any pap smears or colon screening.  No

## 2020-02-06 PROBLEM — C50.912 MALIGNANT NEOPLASM OF LEFT BREAST IN FEMALE, ESTROGEN RECEPTOR NEGATIVE (HCC): Status: ACTIVE | Noted: 2020-02-06

## 2020-02-06 PROBLEM — Z17.1 MALIGNANT NEOPLASM OF LEFT BREAST IN FEMALE, ESTROGEN RECEPTOR NEGATIVE (HCC): Status: ACTIVE | Noted: 2020-02-06

## 2020-02-06 RX ORDER — DIPHENHYDRAMINE HYDROCHLORIDE 50 MG/ML
50 INJECTION, SOLUTION INTRAMUSCULAR; INTRAVENOUS AS NEEDED
Status: CANCELLED
Start: 2020-02-18

## 2020-02-06 RX ORDER — ALBUTEROL SULFATE 0.83 MG/ML
2.5 SOLUTION RESPIRATORY (INHALATION) AS NEEDED
Status: CANCELLED
Start: 2020-02-18

## 2020-02-06 RX ORDER — DIPHENHYDRAMINE HYDROCHLORIDE 50 MG/ML
25 INJECTION, SOLUTION INTRAMUSCULAR; INTRAVENOUS AS NEEDED
Status: CANCELLED
Start: 2020-03-10

## 2020-02-06 RX ORDER — EPINEPHRINE 1 MG/ML
0.3 INJECTION, SOLUTION, CONCENTRATE INTRAVENOUS AS NEEDED
Status: CANCELLED | OUTPATIENT
Start: 2020-03-31

## 2020-02-06 RX ORDER — SODIUM CHLORIDE 9 MG/ML
10 INJECTION INTRAMUSCULAR; INTRAVENOUS; SUBCUTANEOUS AS NEEDED
Status: CANCELLED | OUTPATIENT
Start: 2020-04-21

## 2020-02-06 RX ORDER — PALONOSETRON 0.05 MG/ML
0.25 INJECTION, SOLUTION INTRAVENOUS ONCE
Status: CANCELLED
Start: 2020-04-21

## 2020-02-06 RX ORDER — ACETAMINOPHEN 325 MG/1
650 TABLET ORAL AS NEEDED
Status: CANCELLED
Start: 2020-04-21

## 2020-02-06 RX ORDER — SODIUM CHLORIDE 9 MG/ML
10 INJECTION INTRAMUSCULAR; INTRAVENOUS; SUBCUTANEOUS AS NEEDED
Status: CANCELLED | OUTPATIENT
Start: 2020-03-31

## 2020-02-06 RX ORDER — ALBUTEROL SULFATE 0.83 MG/ML
2.5 SOLUTION RESPIRATORY (INHALATION) AS NEEDED
Status: CANCELLED
Start: 2020-03-10

## 2020-02-06 RX ORDER — DIPHENHYDRAMINE HYDROCHLORIDE 50 MG/ML
50 INJECTION, SOLUTION INTRAMUSCULAR; INTRAVENOUS AS NEEDED
Status: CANCELLED
Start: 2020-03-10

## 2020-02-06 RX ORDER — DEXAMETHASONE SODIUM PHOSPHATE 100 MG/10ML
10 INJECTION INTRAMUSCULAR; INTRAVENOUS ONCE
Status: CANCELLED | OUTPATIENT
Start: 2020-03-10

## 2020-02-06 RX ORDER — ACETAMINOPHEN 325 MG/1
650 TABLET ORAL AS NEEDED
Status: CANCELLED
Start: 2020-02-18

## 2020-02-06 RX ORDER — PALONOSETRON 0.05 MG/ML
0.25 INJECTION, SOLUTION INTRAVENOUS ONCE
Status: CANCELLED
Start: 2020-02-18

## 2020-02-06 RX ORDER — HYDROCORTISONE SODIUM SUCCINATE 100 MG/2ML
100 INJECTION, POWDER, FOR SOLUTION INTRAMUSCULAR; INTRAVENOUS AS NEEDED
Status: CANCELLED | OUTPATIENT
Start: 2020-04-21

## 2020-02-06 RX ORDER — SODIUM CHLORIDE 0.9 % (FLUSH) 0.9 %
10 SYRINGE (ML) INJECTION AS NEEDED
Status: CANCELLED
Start: 2020-03-10

## 2020-02-06 RX ORDER — EPINEPHRINE 1 MG/ML
0.3 INJECTION, SOLUTION, CONCENTRATE INTRAVENOUS AS NEEDED
Status: CANCELLED | OUTPATIENT
Start: 2020-02-18

## 2020-02-06 RX ORDER — PALONOSETRON 0.05 MG/ML
0.25 INJECTION, SOLUTION INTRAVENOUS ONCE
Status: CANCELLED
Start: 2020-03-10

## 2020-02-06 RX ORDER — SODIUM CHLORIDE 9 MG/ML
10 INJECTION INTRAMUSCULAR; INTRAVENOUS; SUBCUTANEOUS AS NEEDED
Status: CANCELLED | OUTPATIENT
Start: 2020-02-18

## 2020-02-06 RX ORDER — ONDANSETRON 2 MG/ML
8 INJECTION INTRAMUSCULAR; INTRAVENOUS AS NEEDED
Status: CANCELLED | OUTPATIENT
Start: 2020-04-21

## 2020-02-06 RX ORDER — SODIUM CHLORIDE 0.9 % (FLUSH) 0.9 %
10 SYRINGE (ML) INJECTION AS NEEDED
Status: CANCELLED
Start: 2020-03-31

## 2020-02-06 RX ORDER — DEXAMETHASONE SODIUM PHOSPHATE 100 MG/10ML
10 INJECTION INTRAMUSCULAR; INTRAVENOUS ONCE
Status: CANCELLED | OUTPATIENT
Start: 2020-04-21

## 2020-02-06 RX ORDER — HEPARIN 100 UNIT/ML
300-500 SYRINGE INTRAVENOUS AS NEEDED
Status: CANCELLED
Start: 2020-03-31

## 2020-02-06 RX ORDER — DIPHENHYDRAMINE HYDROCHLORIDE 50 MG/ML
50 INJECTION, SOLUTION INTRAMUSCULAR; INTRAVENOUS AS NEEDED
Status: CANCELLED
Start: 2020-04-21

## 2020-02-06 RX ORDER — EPINEPHRINE 1 MG/ML
0.3 INJECTION, SOLUTION, CONCENTRATE INTRAVENOUS AS NEEDED
Status: CANCELLED | OUTPATIENT
Start: 2020-03-10

## 2020-02-06 RX ORDER — HEPARIN 100 UNIT/ML
300-500 SYRINGE INTRAVENOUS AS NEEDED
Status: CANCELLED
Start: 2020-02-18

## 2020-02-06 RX ORDER — HYDROCORTISONE SODIUM SUCCINATE 100 MG/2ML
100 INJECTION, POWDER, FOR SOLUTION INTRAMUSCULAR; INTRAVENOUS AS NEEDED
Status: CANCELLED | OUTPATIENT
Start: 2020-02-18

## 2020-02-06 RX ORDER — DIPHENHYDRAMINE HYDROCHLORIDE 50 MG/ML
25 INJECTION, SOLUTION INTRAMUSCULAR; INTRAVENOUS AS NEEDED
Status: CANCELLED
Start: 2020-02-18

## 2020-02-06 RX ORDER — ONDANSETRON 2 MG/ML
8 INJECTION INTRAMUSCULAR; INTRAVENOUS AS NEEDED
Status: CANCELLED | OUTPATIENT
Start: 2020-03-31

## 2020-02-06 RX ORDER — EPINEPHRINE 1 MG/ML
0.3 INJECTION, SOLUTION, CONCENTRATE INTRAVENOUS AS NEEDED
Status: CANCELLED | OUTPATIENT
Start: 2020-04-21

## 2020-02-06 RX ORDER — DIPHENHYDRAMINE HYDROCHLORIDE 50 MG/ML
25 INJECTION, SOLUTION INTRAMUSCULAR; INTRAVENOUS AS NEEDED
Status: CANCELLED
Start: 2020-04-21

## 2020-02-06 RX ORDER — SODIUM CHLORIDE 0.9 % (FLUSH) 0.9 %
10 SYRINGE (ML) INJECTION AS NEEDED
Status: CANCELLED
Start: 2020-04-21

## 2020-02-06 RX ORDER — HYDROCORTISONE SODIUM SUCCINATE 100 MG/2ML
100 INJECTION, POWDER, FOR SOLUTION INTRAMUSCULAR; INTRAVENOUS AS NEEDED
Status: CANCELLED | OUTPATIENT
Start: 2020-03-31

## 2020-02-06 RX ORDER — HYDROCORTISONE SODIUM SUCCINATE 100 MG/2ML
100 INJECTION, POWDER, FOR SOLUTION INTRAMUSCULAR; INTRAVENOUS AS NEEDED
Status: CANCELLED | OUTPATIENT
Start: 2020-03-10

## 2020-02-06 RX ORDER — ALBUTEROL SULFATE 0.83 MG/ML
2.5 SOLUTION RESPIRATORY (INHALATION) AS NEEDED
Status: CANCELLED
Start: 2020-04-21

## 2020-02-06 RX ORDER — DEXAMETHASONE SODIUM PHOSPHATE 100 MG/10ML
10 INJECTION INTRAMUSCULAR; INTRAVENOUS ONCE
Status: CANCELLED | OUTPATIENT
Start: 2020-03-31

## 2020-02-06 RX ORDER — ONDANSETRON 2 MG/ML
8 INJECTION INTRAMUSCULAR; INTRAVENOUS AS NEEDED
Status: CANCELLED | OUTPATIENT
Start: 2020-03-10

## 2020-02-06 RX ORDER — ALBUTEROL SULFATE 0.83 MG/ML
2.5 SOLUTION RESPIRATORY (INHALATION) AS NEEDED
Status: CANCELLED
Start: 2020-03-31

## 2020-02-06 RX ORDER — SODIUM CHLORIDE 9 MG/ML
25 INJECTION, SOLUTION INTRAVENOUS CONTINUOUS
Status: CANCELLED | OUTPATIENT
Start: 2020-02-18

## 2020-02-06 RX ORDER — ONDANSETRON 2 MG/ML
8 INJECTION INTRAMUSCULAR; INTRAVENOUS AS NEEDED
Status: CANCELLED | OUTPATIENT
Start: 2020-02-18

## 2020-02-06 RX ORDER — DIPHENHYDRAMINE HYDROCHLORIDE 50 MG/ML
50 INJECTION, SOLUTION INTRAMUSCULAR; INTRAVENOUS AS NEEDED
Status: CANCELLED
Start: 2020-03-31

## 2020-02-06 RX ORDER — HEPARIN 100 UNIT/ML
300-500 SYRINGE INTRAVENOUS AS NEEDED
Status: CANCELLED
Start: 2020-03-10

## 2020-02-06 RX ORDER — ACETAMINOPHEN 325 MG/1
650 TABLET ORAL AS NEEDED
Status: CANCELLED
Start: 2020-03-10

## 2020-02-06 RX ORDER — SODIUM CHLORIDE 9 MG/ML
10 INJECTION INTRAMUSCULAR; INTRAVENOUS; SUBCUTANEOUS AS NEEDED
Status: CANCELLED | OUTPATIENT
Start: 2020-03-10

## 2020-02-06 RX ORDER — SODIUM CHLORIDE 9 MG/ML
25 INJECTION, SOLUTION INTRAVENOUS CONTINUOUS
Status: CANCELLED | OUTPATIENT
Start: 2020-04-21

## 2020-02-06 RX ORDER — SODIUM CHLORIDE 9 MG/ML
25 INJECTION, SOLUTION INTRAVENOUS CONTINUOUS
Status: CANCELLED | OUTPATIENT
Start: 2020-03-31

## 2020-02-06 RX ORDER — ACETAMINOPHEN 325 MG/1
650 TABLET ORAL AS NEEDED
Status: CANCELLED
Start: 2020-03-31

## 2020-02-06 RX ORDER — HEPARIN 100 UNIT/ML
300-500 SYRINGE INTRAVENOUS AS NEEDED
Status: CANCELLED
Start: 2020-04-21

## 2020-02-06 RX ORDER — PALONOSETRON 0.05 MG/ML
0.25 INJECTION, SOLUTION INTRAVENOUS ONCE
Status: CANCELLED
Start: 2020-03-31

## 2020-02-06 RX ORDER — DEXAMETHASONE SODIUM PHOSPHATE 100 MG/10ML
10 INJECTION INTRAMUSCULAR; INTRAVENOUS ONCE
Status: CANCELLED | OUTPATIENT
Start: 2020-02-18

## 2020-02-06 RX ORDER — DIPHENHYDRAMINE HYDROCHLORIDE 50 MG/ML
25 INJECTION, SOLUTION INTRAMUSCULAR; INTRAVENOUS AS NEEDED
Status: CANCELLED
Start: 2020-03-31

## 2020-02-06 RX ORDER — SODIUM CHLORIDE 9 MG/ML
25 INJECTION, SOLUTION INTRAVENOUS CONTINUOUS
Status: CANCELLED | OUTPATIENT
Start: 2020-03-10

## 2020-02-06 RX ORDER — SODIUM CHLORIDE 0.9 % (FLUSH) 0.9 %
10 SYRINGE (ML) INJECTION AS NEEDED
Status: CANCELLED
Start: 2020-02-18

## 2020-02-06 NOTE — ONCOLOGY PATHWAY NOTE
START ON PATHWAY REGIMEN - Breast    CAT437        Docetaxel (Taxotere(R))       Cyclophosphamide (Cytoxan(R))     **Always confirm dose/schedule in your pharmacy ordering system**    Patient Characteristics:  Postoperative without Neoadjuvant Therapy (Pathologic Staging), Invasive Disease, Adjuvant Therapy, HER2 Negative/Unknown/Equivocal, ER Negative/Unknown, Node Negative, pT1a-c, pN0/N1mi or pT2 or Higher, pN0  Therapeutic Status: Postoperative without Neoadjuvant Therapy (Pathologic Staging)  AJCC Grade: G3  AJCC N Category: pN0  AJCC M Category: cM0  ER Status: Negative (-)  AJCC 8 Stage Grouping: IB  HER2 Status: Negative (-)  Oncotype Dx Recurrence Score: Not Appropriate  AJCC T Category: pT1a  OH Status: Negative (-)  Intent of Therapy:  Curative Intent, Discussed with Patient

## 2020-02-11 ENCOUNTER — TELEPHONE (OUTPATIENT)
Dept: ONCOLOGY | Age: 45
End: 2020-02-11

## 2020-02-12 ENCOUNTER — OFFICE VISIT (OUTPATIENT)
Dept: ONCOLOGY | Age: 45
End: 2020-02-12

## 2020-02-12 VITALS
SYSTOLIC BLOOD PRESSURE: 125 MMHG | OXYGEN SATURATION: 97 % | DIASTOLIC BLOOD PRESSURE: 80 MMHG | WEIGHT: 225 LBS | RESPIRATION RATE: 18 BRPM | HEIGHT: 61 IN | TEMPERATURE: 98.3 F | BODY MASS INDEX: 42.48 KG/M2 | HEART RATE: 72 BPM

## 2020-02-12 DIAGNOSIS — Z17.1 MALIGNANT NEOPLASM OF CENTRAL PORTION OF LEFT BREAST IN FEMALE, ESTROGEN RECEPTOR NEGATIVE (HCC): Primary | ICD-10-CM

## 2020-02-12 DIAGNOSIS — C50.112 MALIGNANT NEOPLASM OF CENTRAL PORTION OF LEFT BREAST IN FEMALE, ESTROGEN RECEPTOR NEGATIVE (HCC): Primary | ICD-10-CM

## 2020-02-12 RX ORDER — OSELTAMIVIR PHOSPHATE 75 MG/1
75 CAPSULE ORAL DAILY
Qty: 7 CAP | Refills: 0 | Status: SHIPPED | OUTPATIENT
Start: 2020-02-12 | End: 2020-02-17

## 2020-02-12 RX ORDER — LIDOCAINE AND PRILOCAINE 25; 25 MG/G; MG/G
CREAM TOPICAL AS NEEDED
Qty: 30 G | Refills: 0 | Status: SHIPPED | OUTPATIENT
Start: 2020-02-12 | End: 2021-10-26

## 2020-02-12 RX ORDER — DEXAMETHASONE 4 MG/1
TABLET ORAL
Qty: 32 TAB | Refills: 3 | Status: SHIPPED | OUTPATIENT
Start: 2020-02-12 | End: 2021-10-26

## 2020-02-12 RX ORDER — ONDANSETRON HYDROCHLORIDE 8 MG/1
8 TABLET, FILM COATED ORAL
Qty: 60 TAB | Refills: 3 | Status: SHIPPED | OUTPATIENT
Start: 2020-02-12 | End: 2021-10-26

## 2020-02-12 RX ORDER — PROCHLORPERAZINE MALEATE 10 MG
10 TABLET ORAL
Qty: 50 TAB | Refills: 5 | Status: SHIPPED | OUTPATIENT
Start: 2020-02-12 | End: 2021-10-26

## 2020-02-12 NOTE — PROGRESS NOTES
Conor Menezes is a 38 y.o. female Follow up for the Evaluation for Breast Cancer. 1. Have you been to the ER, urgent care clinic since your last visit? Hospitalized since your last visit? No    2. Have you seen or consulted any other health care providers outside of the [address] [place] [address] since your last visit? Include any pap smears or colon screening.  No

## 2020-02-12 NOTE — PATIENT INSTRUCTIONS
presciptions for a wig, emla cream, decadron to take 8 mg twice a day the day before and day after chemo, zofran and compazine.

## 2020-02-12 NOTE — PROGRESS NOTES
[facility] [place] at [facility]  [address] [facility], [address]  [place]: [phone]  F: [phone]      Reason for Visit:   Alanna Gallego is a 38 y.o. female who is seen in consultation at the request of Dr. Augusto Forman for evaluation of therapy for breast cancer (he called and asked if I could see her)    Surgeon:  Dr. Barkley Essex surgeon:  Dr. Roselia Robles    Treatment History:   · 10/2/19 left breast core bx:  DCIS gr 2-3, ER negative, AK negative  · 19 left breast lumpectomy:  IDC, gr 3, 8 mm, ER negative, AK negative, HER 2 negative at IHC 0, Ki-67 60%, 2 mm DCIS, gr 3, 0/3 LN, pT1b pN0 cM0  · [employer] genetic testing:  negative    History of Present Illness: An abnormal mammogram led to the pathology above. Interval history:  In today for follow up. Complains of gr 1 bleeding, Gr 1 skin rash. FH:  Paternal Great aunts (two of them), 53-62 and 64-70 years old; maternal great aunt in her 76s; no ovarian, advanced prostate cancer, or pancreas cancer    LMP 19    Past Medical History:   Diagnosis Date    Headache     Liver tumor (benign)       Past Surgical History:   Procedure Laterality Date    HX  SECTION      x 2    AK HISTORY NEW OR CHANGING MOLES      pre-cancerous       Social History     Tobacco Use    Smoking status: Former Smoker     Last attempt to quit: 5/10/2008     Years since quittin.7    Smokeless tobacco: Never Used   Substance Use Topics    Alcohol use: Yes     Comment: rarely      Family History   Problem Relation Age of Onset    Thyroid Disease Mother     Thyroid Disease Sister      Current Outpatient Medications   Medication Sig    escitalopram oxalate (LEXAPRO) 5 mg tablet Take  by mouth daily.  SUMAtriptan (IMITREX) 50 mg tablet Take 50 mg by mouth once as needed for Migraine.  cephalexin (KEFLEX PO) Take  by mouth.  topiramate ER (TROKENDI XR) 25 mg capsule Take 1 Cap by mouth daily.     topiramate ER (TROKENDI XR) 50 mg capsule Take 1 Cap by mouth daily.  phentermine (ADIPEX-P) 37.5 mg tablet Take 1 Tab by mouth every morning. Take between 7 and 9 am daily.  guaiFENesin SR (MUCINEX) 600 mg SR tablet Take 1 Tab by mouth two (2) times a day. No current facility-administered medications for this visit. Allergies   Allergen Reactions    Sulfa (Sulfonamide Antibiotics) Hives        Review of Systems: A complete review of systems was obtained, negative except as described above. Physical Exam:     Visit Vitals  /80 (BP 1 Location: Left arm, BP Patient Position: Sitting)   Pulse 72   Temp 98.3 °F (36.8 °C) (Temporal)   Resp 18   Ht 5' 1\" (1.549 m)   Wt 225 lb (102.1 kg)   SpO2 97%   BMI 42.51 kg/m²     ECOG PS: 0  General: No distress  Respiratory: Normal respiratory effort  CV: No peripheral edema  Skin: No rashes, ecchymoses, or petechiae  Psych: Alert, oriented, normal mood/affect      Breasts:  Right breast with open would, bleeding, no erythema; healthy pink tissue, no necrotic tissue    Results:     Lab Results   Component Value Date/Time    WBC 8.7 01/14/2013 12:46 PM    HGB 15.3 01/14/2013 12:46 PM    HCT 45.3 01/14/2013 12:46 PM    PLATELET 160 47/13/5031 12:46 PM    MCV 90 01/14/2013 12:46 PM     Lab Results   Component Value Date/Time    Sodium 139 01/14/2013 12:46 PM    Potassium 4.0 01/14/2013 12:46 PM    Chloride 103 01/14/2013 12:46 PM    CO2 21 01/14/2013 12:46 PM    Glucose 80 01/14/2013 12:46 PM    BUN 12 01/14/2013 12:46 PM    Creatinine 0.68 01/14/2013 12:46 PM    GFR est  05/31/2011 03:48 PM    GFR est non- 01/14/2013 12:46 PM    Calcium 9.4 01/14/2013 12:46 PM     Lab Results   Component Value Date/Time    Bilirubin, total 0.3 01/14/2013 12:46 PM    ALT (SGPT) 13 01/14/2013 12:46 PM    AST (SGOT) 12 01/14/2013 12:46 PM    Alk.  phosphatase 54 01/14/2013 12:46 PM    Protein, total 7.3 01/14/2013 12:46 PM    Albumin 4.6 01/14/2013 12:46 PM     10/17/19 breast MRI  DCIS at 12:00 8 [facility]      Records reviewed and summarized above. Pathology report(s) reviewed above. Radiology report(s) reviewed above. Assessment/plan:   1. 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 We explained why a patient can have microscopic cancer spread now even though physical examination, laboratory studies and imaging studies are negative for cancer. We explained that the same treatments used now as adjuvant or preventive treatments rarely if ever are curative in women who develop metastases. I discussed the potential risks of dose-dense chemotherapy with the patient. (DD AC-T, adriamycin 60 mg/m2; cyclophosphamide 600 mg/m2 q 2 weeks x 4; paclitaxel 80 mg/m2 qweekly x 12). Major toxicities include nausea and vomiting, stomatitis, fatigue, and a small risk of heart damage. Anemia frequently results and occasionally requires growth factors and rarely transfusions. Neutropenic fever is uncommon, but can be a life-threatening problem. Also, there is a small but increased risk of myelodysplasia and acute leukemia. We provided the patient with detailed information concerning toxicity including frequent toxicities that only last a few days, such as nausea, vomiting, mouth sores, arthralgia, myalgia, and potentially allergic reactions to paclitaxel, as well as toxicities which can be longer lasting including total alopecia, fatigue, anemia and neuropathy. We provided the patient with detailed information concerning the toxicities of their regimen in addition to our verbal discussion. We discussed the toxicities of TC chemotherapy (docetaxel 75mg/m2/cyclophosphamide 600 mg/m2 q 3 weeks x 4)  in detail.  This chemotherapy frequently causes a low white blood cell count and a small percent of patients require hospital admission for treatment of neutropenic fever. We explained that on occasion we consider the use of growth factors to minimize this risk. We explained to the patient that some side effects, if they occur, only last a few days including nausea, vomiting, stomatitis, arthralgia, myalgia, and allergic reactions to Taxotere. We told the patient that severe nausea and vomiting were very uncommon and that some side effects, if they occur, will last longer: this includes hair loss, which will be seen in all patients treated with these agents and fatigue, which will be seen in most. The patient was also told that if she is having menstrual function that she could develop chemotherapy-induced amenorrhea and infertility. We also told the patient that there was a very small risk of acute leukemia. We also informed the patient that heart damage is rare with these agents    Would use neulasta for TC. Using Predict!, there is a 2% OS difference between 2nd and 3rd gen chemo. OS benefit of chemo is 5-6%. For her, I recommend TC q 3 weeks x 4. Discussed cold caps and she is interested and was fitted. Also discussed oral and peripheral cryotherapy. She has done her own research and has decided on chemotherapy, but is concerned about missing her window due to her right breast wound. She is aware of the literature reporting improvement in [place] breast cancer survival if chemotherapy is started within 60 days from surgery. The absolute window would be 12 weeks from surgery. 60 days would be 2/18. I spoke to Dr. Deidre Alberto and he is ok with proceeding with chemotherapy. Dr. Urrutia Level to place port on 2/17. Plan to start TC on 2/18, orders done. She is agreeable to TC  q 3 weeks x 4 and will sign informed consent at the next visit. The patient was given presciptions for a wig, emla cream, decadron to take 8 mg bid the day before and day after chemo, zofran and compazine.   Neulasta the following day.    2. Emotional well being:  She has excellent support and is coping well with her disease    3. tob use:  Has quit    4. Right breast wound:  No longer infected, healing well, but open wound, see above    5. Deborra Knoll age at diagnosis:  Genetic testing negative    6. Flu exposure:  Son; will give tamiflu 75 mg daily    > 25 min were spent with this patient with > 50% of that time spent in face to face counseling. I appreciate the opportunity to participate in Ms. Naman Enrique care. Signed By: Leanna Owens MD      No orders of the defined types were placed in this encounter.

## 2020-02-13 ENCOUNTER — TELEPHONE (OUTPATIENT)
Dept: ONCOLOGY | Age: 45
End: 2020-02-13

## 2020-02-13 NOTE — TELEPHONE ENCOUNTER
2/13/2020 4:47 PM: Called patient and advised that this office is working on getting her chemotherapy scheduled on Tuesday, 2/18. Explained that the infusion center is completely booked that day so the [facility] is working on adding her on. Advised that the patient will receive a call once she is scheduled. Patient stated that she has not heard from [facility] yet about cold caps; advised patient that the enrollment forms were sent yesterday and confirmation was received from [facility] that they received them. Provided their telephone number ([phone]) so the patient can call them. Patient denied further questions or concerns at this time.

## 2020-02-18 ENCOUNTER — HOSPITAL ENCOUNTER (OUTPATIENT)
Dept: INFUSION THERAPY | Age: 45
Discharge: HOME | End: 2020-02-18
Payer: COMMERCIAL

## 2020-02-18 ENCOUNTER — OFFICE VISIT (OUTPATIENT)
Dept: ONCOLOGY | Age: 45
End: 2020-02-18

## 2020-02-18 VITALS
DIASTOLIC BLOOD PRESSURE: 71 MMHG | TEMPERATURE: 98.3 F | WEIGHT: 222.2 LBS | HEIGHT: 62 IN | OXYGEN SATURATION: 96 % | SYSTOLIC BLOOD PRESSURE: 112 MMHG | BODY MASS INDEX: 40.89 KG/M2 | RESPIRATION RATE: 18 BRPM | HEART RATE: 65 BPM

## 2020-02-18 VITALS
RESPIRATION RATE: 18 BRPM | DIASTOLIC BLOOD PRESSURE: 79 MMHG | BODY MASS INDEX: 41.91 KG/M2 | OXYGEN SATURATION: 96 % | SYSTOLIC BLOOD PRESSURE: 117 MMHG | WEIGHT: 222 LBS | TEMPERATURE: 98.3 F | HEART RATE: 84 BPM | HEIGHT: 61 IN

## 2020-02-18 DIAGNOSIS — C50.112 MALIGNANT NEOPLASM OF CENTRAL PORTION OF LEFT BREAST IN FEMALE, ESTROGEN RECEPTOR NEGATIVE (HCC): Primary | ICD-10-CM

## 2020-02-18 DIAGNOSIS — C50.912 MALIGNANT NEOPLASM OF LEFT BREAST IN FEMALE, ESTROGEN RECEPTOR NEGATIVE, UNSPECIFIED SITE OF BREAST (HCC): Primary | ICD-10-CM

## 2020-02-18 DIAGNOSIS — Z17.1 MALIGNANT NEOPLASM OF CENTRAL PORTION OF LEFT BREAST IN FEMALE, ESTROGEN RECEPTOR NEGATIVE (HCC): Primary | ICD-10-CM

## 2020-02-18 DIAGNOSIS — Z17.1 MALIGNANT NEOPLASM OF LEFT BREAST IN FEMALE, ESTROGEN RECEPTOR NEGATIVE, UNSPECIFIED SITE OF BREAST (HCC): Primary | ICD-10-CM

## 2020-02-18 LAB
ALBUMIN SERPL-MCNC: 3.4 G/DL (ref 3.5–5)
ALBUMIN/GLOB SERPL: 1.1 {RATIO} (ref 1.1–2.2)
ALP SERPL-CCNC: 44 U/L (ref 45–117)
ALT SERPL-CCNC: 19 U/L (ref 12–78)
ANION GAP SERPL CALC-SCNC: 8 MMOL/L (ref 5–15)
AST SERPL-CCNC: 8 U/L (ref 15–37)
BASO+EOS+MONOS # BLD AUTO: 0.2 K/UL (ref 0.2–1.2)
BASO+EOS+MONOS NFR BLD AUTO: 2 % (ref 3.2–16.9)
BILIRUB SERPL-MCNC: 0.1 MG/DL (ref 0.2–1)
BUN SERPL-MCNC: 11 MG/DL (ref 6–20)
BUN/CREAT SERPL: 16 (ref 12–20)
CALCIUM SERPL-MCNC: 8.4 MG/DL (ref 8.5–10.1)
CHLORIDE SERPL-SCNC: 110 MMOL/L (ref 97–108)
CO2 SERPL-SCNC: 24 MMOL/L (ref 21–32)
CREAT SERPL-MCNC: 0.7 MG/DL (ref 0.55–1.02)
DIFFERENTIAL METHOD BLD: ABNORMAL
ERYTHROCYTE [DISTWIDTH] IN BLOOD BY AUTOMATED COUNT: 12.9 % (ref 11.8–15.8)
GLOBULIN SER CALC-MCNC: 3.2 G/DL (ref 2–4)
GLUCOSE SERPL-MCNC: 165 MG/DL (ref 65–100)
HCT VFR BLD AUTO: 38.4 % (ref 35–47)
HGB BLD-MCNC: 13 G/DL (ref 11.5–16)
LYMPHOCYTES # BLD: 1.6 K/UL (ref 0.8–3.5)
LYMPHOCYTES NFR BLD: 14 % (ref 12–49)
MCH RBC QN AUTO: 30.6 PG (ref 26–34)
MCHC RBC AUTO-ENTMCNC: 33.9 G/DL (ref 30–36.5)
MCV RBC AUTO: 90.4 FL (ref 80–99)
NEUTS SEG # BLD: 9.8 K/UL (ref 1.8–8)
NEUTS SEG NFR BLD: 84 % (ref 32–75)
PLATELET # BLD AUTO: 269 K/UL (ref 150–400)
POTASSIUM SERPL-SCNC: 3.1 MMOL/L (ref 3.5–5.1)
PROT SERPL-MCNC: 6.6 G/DL (ref 6.4–8.2)
RBC # BLD AUTO: 4.25 M/UL (ref 3.8–5.2)
SODIUM SERPL-SCNC: 142 MMOL/L (ref 136–145)
WBC # BLD AUTO: 11.6 K/UL (ref 3.6–11)

## 2020-02-18 PROCEDURE — 80053 COMPREHEN METABOLIC PANEL: CPT

## 2020-02-18 PROCEDURE — 74011250637 HC RX REV CODE- 250/637: Performed by: NURSE PRACTITIONER

## 2020-02-18 PROCEDURE — 74011250636 HC RX REV CODE- 250/636: Performed by: INTERNAL MEDICINE

## 2020-02-18 PROCEDURE — 96413 CHEMO IV INFUSION 1 HR: CPT

## 2020-02-18 PROCEDURE — 96417 CHEMO IV INFUS EACH ADDL SEQ: CPT

## 2020-02-18 PROCEDURE — 85025 COMPLETE CBC W/AUTO DIFF WBC: CPT

## 2020-02-18 PROCEDURE — 77030012965 HC NDL HUBR BBMI -A

## 2020-02-18 PROCEDURE — 36415 COLL VENOUS BLD VENIPUNCTURE: CPT

## 2020-02-18 PROCEDURE — 96375 TX/PRO/DX INJ NEW DRUG ADDON: CPT

## 2020-02-18 RX ORDER — DIPHENHYDRAMINE HYDROCHLORIDE 50 MG/ML
25 INJECTION, SOLUTION INTRAMUSCULAR; INTRAVENOUS AS NEEDED
Status: DISCONTINUED | OUTPATIENT
Start: 2020-02-18 | End: 2020-02-19 | Stop reason: HOSPADM

## 2020-02-18 RX ORDER — ONDANSETRON 2 MG/ML
8 INJECTION INTRAMUSCULAR; INTRAVENOUS AS NEEDED
Status: DISCONTINUED | OUTPATIENT
Start: 2020-02-18 | End: 2020-02-19 | Stop reason: HOSPADM

## 2020-02-18 RX ORDER — SODIUM CHLORIDE 9 MG/ML
25 INJECTION, SOLUTION INTRAVENOUS CONTINUOUS
Status: DISCONTINUED | OUTPATIENT
Start: 2020-02-18 | End: 2020-02-19 | Stop reason: HOSPADM

## 2020-02-18 RX ORDER — ACETAMINOPHEN 325 MG/1
650 TABLET ORAL AS NEEDED
Status: DISCONTINUED | OUTPATIENT
Start: 2020-02-18 | End: 2020-02-19 | Stop reason: HOSPADM

## 2020-02-18 RX ORDER — HEPARIN 100 UNIT/ML
300-500 SYRINGE INTRAVENOUS AS NEEDED
Status: DISCONTINUED | OUTPATIENT
Start: 2020-02-18 | End: 2020-02-19 | Stop reason: HOSPADM

## 2020-02-18 RX ORDER — DEXAMETHASONE SODIUM PHOSPHATE 100 MG/10ML
10 INJECTION INTRAMUSCULAR; INTRAVENOUS ONCE
Status: COMPLETED | OUTPATIENT
Start: 2020-02-18 | End: 2020-02-18

## 2020-02-18 RX ORDER — PALONOSETRON 0.05 MG/ML
0.25 INJECTION, SOLUTION INTRAVENOUS ONCE
Status: COMPLETED | OUTPATIENT
Start: 2020-02-18 | End: 2020-02-18

## 2020-02-18 RX ORDER — SODIUM CHLORIDE 0.9 % (FLUSH) 0.9 %
10 SYRINGE (ML) INJECTION AS NEEDED
Status: DISCONTINUED | OUTPATIENT
Start: 2020-02-18 | End: 2020-02-19 | Stop reason: HOSPADM

## 2020-02-18 RX ORDER — HYDROCORTISONE SODIUM SUCCINATE 100 MG/2ML
100 INJECTION, POWDER, FOR SOLUTION INTRAMUSCULAR; INTRAVENOUS AS NEEDED
Status: DISCONTINUED | OUTPATIENT
Start: 2020-02-18 | End: 2020-02-19 | Stop reason: HOSPADM

## 2020-02-18 RX ORDER — SODIUM CHLORIDE 9 MG/ML
10 INJECTION INTRAMUSCULAR; INTRAVENOUS; SUBCUTANEOUS AS NEEDED
Status: DISCONTINUED | OUTPATIENT
Start: 2020-02-18 | End: 2020-02-19 | Stop reason: HOSPADM

## 2020-02-18 RX ORDER — POTASSIUM CHLORIDE 750 MG/1
40 TABLET, FILM COATED, EXTENDED RELEASE ORAL
Status: COMPLETED | OUTPATIENT
Start: 2020-02-18 | End: 2020-02-18

## 2020-02-18 RX ADMIN — POTASSIUM CHLORIDE 40 MEQ: 750 TABLET, FILM COATED, EXTENDED RELEASE ORAL at 13:03

## 2020-02-18 RX ADMIN — SODIUM CHLORIDE 10 ML: 9 INJECTION INTRAMUSCULAR; INTRAVENOUS; SUBCUTANEOUS at 17:17

## 2020-02-18 RX ADMIN — DOCETAXEL ANHYDROUS 157 MG: 10 INJECTION, SOLUTION INTRAVENOUS at 14:12

## 2020-02-18 RX ADMIN — POTASSIUM CHLORIDE 40 MEQ: 750 TABLET, FILM COATED, EXTENDED RELEASE ORAL at 14:13

## 2020-02-18 RX ADMIN — DEXAMETHASONE SODIUM PHOSPHATE 10 MG: 10 INJECTION INTRAMUSCULAR; INTRAVENOUS at 12:56

## 2020-02-18 RX ADMIN — CYCLOPHOSPHAMIDE 1254 MG: 1 INJECTION, POWDER, FOR SOLUTION INTRAVENOUS; ORAL at 15:29

## 2020-02-18 RX ADMIN — PALONOSETRON HYDROCHLORIDE 0.25 MG: 0.25 INJECTION INTRAVENOUS at 12:53

## 2020-02-18 RX ADMIN — SODIUM CHLORIDE 500 ML: 900 INJECTION, SOLUTION INTRAVENOUS at 12:48

## 2020-02-18 RX ADMIN — PEGFILGRASTIM 6 MG: KIT SUBCUTANEOUS at 16:38

## 2020-02-18 RX ADMIN — SODIUM CHLORIDE 25 ML/HR: 900 INJECTION, SOLUTION INTRAVENOUS at 12:48

## 2020-02-18 RX ADMIN — Medication 500 UNITS: at 17:17

## 2020-02-18 NOTE — PROGRESS NOTES
[facility] Progress Note    Date: 2020    Name: ySl Diaz    MRN: [id number]         : 1975    Ms. Barrera Arrived ambulatory and in no distress for C1D1 of TC Regimen. Assessment was completed, no acute issues at this time, no new complaints voiced. Right chest wall port accessed with 1\" inch needle with some difficulty, labs drawn & sent for processing. Chemotherapy Flowsheet 2020   Cycle C1D1   Date 2020   Drug / Regimen TC   Pre Hydration given   Pre Meds given       1115 Patient proceed to appointment with Dr. Scotty Gonzalez    Ms. Barrera's vitals were reviewed. Visit Vitals  /79   Pulse 84   Temp 98.3 °F (36.8 °C)   Resp 18   Ht 5' 2\" (1.575 m)   Wt 100.8 kg (222 lb 3.2 oz)   SpO2 96%   Breastfeeding No   BMI 40.64 kg/m²       Lab results were obtained and reviewed. Recent Results (from the past 12 hour(s))   METABOLIC PANEL, COMPREHENSIVE    Collection Time: 20 10:59 AM   Result Value Ref Range    Sodium 142 136 - 145 mmol/L    Potassium 3.1 (L) 3.5 - 5.1 mmol/L    Chloride 110 (H) 97 - 108 mmol/L    CO2 24 21 - 32 mmol/L    Anion gap 8 5 - 15 mmol/L    Glucose 165 (H) 65 - 100 mg/dL    BUN 11 6 - 20 MG/DL    Creatinine 0.70 0.55 - 1.02 MG/DL    BUN/Creatinine ratio 16 12 - 20      GFR est AA >60 >60 ml/min/1.73m2    GFR est non-AA >60 >60 ml/min/1.73m2    Calcium 8.4 (L) 8.5 - 10.1 MG/DL    Bilirubin, total 0.1 (L) 0.2 - 1.0 MG/DL    ALT (SGPT) 19 12 - 78 U/L    AST (SGOT) 8 (L) 15 - 37 U/L    Alk.  phosphatase 44 (L) 45 - 117 U/L    Protein, total 6.6 6.4 - 8.2 g/dL    Albumin 3.4 (L) 3.5 - 5.0 g/dL    Globulin 3.2 2.0 - 4.0 g/dL    A-G Ratio 1.1 1.1 - 2.2     CBC WITH 3 PART DIFF    Collection Time: 20 10:59 AM   Result Value Ref Range    WBC 11.6 (H) 3.6 - 11.0 K/uL    RBC 4.25 3.80 - 5.20 M/uL    HGB 13.0 11.5 - 16.0 g/dL    HCT 38.4 35.0 - 47.0 %    MCV 90.4 80.0 - 99.0 FL    MCH 30.6 26.0 - 34.0 PG    MCHC 33.9 30.0 - 36.5 g/dL    RDW 12.9 11.8 - 15.8 % PLATELET 017 992 - 071 K/uL    NEUTROPHILS 84 (H) 32 - 75 %    MIXED CELLS 2 (L) 3.2 - 16.9 %    LYMPHOCYTES 14 12 - 49 %    ABS. NEUTROPHILS 9.8 (H) 1.8 - 8.0 K/UL    ABS. MIXED CELLS 0.2 0.2 - 1.2 K/uL    ABS.  LYMPHOCYTES 1.6 0.8 - 3.5 K/UL    DF AUTOMATED         Medications:  Medications Administered     0.9% sodium chloride infusion     Admin Date  02/18/2020 Action  New Bag Dose  25 mL/hr Rate  25 mL/hr Route  IntraVENous Administered By  Hima Lovell RN          0.9% sodium chloride injection 10 mL     Admin Date  02/18/2020 Action  Given Dose  10 mL Route  IntraVENous Administered By  Hima Lovell RN          cyclophosphamide (CYTOXAN) 1,254 mg in 0.9% sodium chloride 250 mL, overfill volume 25 mL chemo infusion     Admin Date  02/18/2020 Action  New Bag Dose  1254 mg Rate  675.4 mL/hr Route  IntraVENous Administered By  Hima Lovell RN          dexamethasone (DECADRON) injection 10 mg     Admin Date  02/18/2020 Action  Given Dose  10 mg Route  IntraVENous Administered By  Hmia Lovell RN          DOCEtaxeL (TAXOTERE) 157 mg in 0.9% sodium chloride 250 mL, overfill volume 25 mL chemo infusion     Admin Date  02/18/2020 Action  New Bag Dose  157 mg Route  IntraVENous Administered By  Hima Lovell RN          heparin (porcine) pf 300-500 Units     Admin Date  02/18/2020 Action  Given Dose  500 Units Route  InterCATHeter Administered By  Hima Lovell RN          palonosetron HCl (ALOXI) injection 0.25 mg     Admin Date  02/18/2020 Action  Given Dose  0.25 mg Route  IntraVENous Administered By  Hima Lovell RN          pegfilgrastim (NEULASTA) wearable SQ injector 6 mg     Admin Date  02/18/2020 Action  Given Dose  6 mg Route  SubCUTAneous Administered By  Hima Lovell RN          potassium chloride SR (KLOR-CON 10) tablet 40 mEq     Admin Date  02/18/2020 Action  Given Dose  40 mEq Route  Oral Administered By  Hima Lovell RN           Admin Date  02/18/2020 Action  Given Dose  40 mEq Route  Oral Administered By  Francine Gonzalez RN          sodium chloride 0.9 % bolus infusion 500 mL     Admin Date  02/18/2020 Action  New Bag Dose  500 mL Route  IntraVENous Administered By  Francine Gonzalez RN              Education provided to patient about Neulasta On Body Injector including: side effects, how/when to remove the device, as well as what to do in the event of device malfunction. Opportunity for questions was provided and all questions were answered. Patient verbalized understanding. Ms. Ivan Thomas tolerated treatment well and was discharged from [place] [address] in stable condition. Port de-accessed, flushed & heparinized per protocol. She is to return on 3/10/20 for her next appointment.     Ania Ortiz RN  February 18, 2020

## 2020-02-18 NOTE — PROGRESS NOTES
Courtney Almanza is a 38 y.o. female Follow up for the Evaluation for Breast Cancer. 1. Have you been to the ER, urgent care clinic since your last visit? Hospitalized since your last visit? No    2. Have you seen or consulted any other health care providers outside of the [address] [place] [address] since your last visit? Include any pap smears or colon screening.  No

## 2020-02-18 NOTE — PROGRESS NOTES
[facility] [place] [facility]  [address] [facility], [address] [place] [address]  Philipp Lunago: [phone]  F: [phone]      Reason for Visit:   Courtney Almanza is a 38 y.o. female who is seen in consultation at the request of Dr. Robert Rios for evaluation of therapy for breast cancer (he called and asked if I could see her)    Surgeon:  Dr. Italo Rios surgeon:  Dr. Mona Gonzalez    Treatment History:   · 10/2/19 left breast core bx:  DCIS gr 2-3, ER negative, OR negative  · 19 left breast lumpectomy:  IDC, gr 3, 8 mm, ER negative, OR negative, HER 2 negative at IHC 0, Ki-67 60%, 2 mm DCIS, gr 3, 0/3 LN, pT1b pN0 cM0  · [employer] genetic testing:  Negative  · TC 20-    History of Present Illness: An abnormal mammogram led to the pathology above. Interval history:  In today for follow up. Complains of gr 1 insomnia, 1/10 pain to right side of chest.     FH:  Paternal Great aunts (two of them), 53-62 and 64-70 years old; maternal great aunt in her 76s; no ovarian, advanced prostate cancer, or pancreas cancer    LMP 2020    Past Medical History:   Diagnosis Date    Headache     Liver tumor (benign)       Past Surgical History:   Procedure Laterality Date    HX  SECTION      x 2    OR HISTORY NEW OR CHANGING MOLES      pre-cancerous       Social History     Tobacco Use    Smoking status: Former Smoker     Last attempt to quit: 5/10/2008     Years since quittin.7    Smokeless tobacco: Never Used   Substance Use Topics    Alcohol use: Yes     Comment: rarely      Family History   Problem Relation Age of Onset    Thyroid Disease Mother     Thyroid Disease Sister      Current Outpatient Medications   Medication Sig    ondansetron hcl (ZOFRAN) 8 mg tablet Take 1 Tab by mouth every eight (8) hours as needed for Nausea.  prochlorperazine (COMPAZINE) 10 mg tablet Take 1 Tab by mouth every six (6) hours as needed for Nausea.     lidocaine-prilocaine (EMLA) topical cream Apply  to affected area as needed for Pain.  dexAMETHasone (DECADRON) 4 mg tablet 8 mg PO bid the day before and day after chemo    escitalopram oxalate (LEXAPRO) 5 mg tablet Take  by mouth daily.  SUMAtriptan (IMITREX) 50 mg tablet Take 50 mg by mouth once as needed for Migraine.  cephalexin (KEFLEX PO) Take  by mouth.  topiramate ER (TROKENDI XR) 25 mg capsule Take 1 Cap by mouth daily.  topiramate ER (TROKENDI XR) 50 mg capsule Take 1 Cap by mouth daily.  phentermine (ADIPEX-P) 37.5 mg tablet Take 1 Tab by mouth every morning. Take between 7 and 9 am daily.  guaiFENesin SR (MUCINEX) 600 mg SR tablet Take 1 Tab by mouth two (2) times a day. No current facility-administered medications for this visit. Allergies   Allergen Reactions    Sulfa (Sulfonamide Antibiotics) Hives        Review of Systems: A complete review of systems was obtained, negative except as described above. Physical Exam:     Visit Vitals  /79 (BP 1 Location: Left arm, BP Patient Position: Sitting)   Pulse 84   Temp 98.3 °F (36.8 °C) (Temporal)   Resp 18   Ht 5' 1\" (1.549 m)   Wt 222 lb (100.7 kg)   SpO2 96%   BMI 41.95 kg/m²     ECOG PS: 0  General: No distress  Eyes: PERRLA, anicteric sclerae  HENT: Atraumatic, OP clear  Neck: Supple  Lymphatic: No cervical, supraclavicular, or inguinal adenopathy  Respiratory: CTAB, normal respiratory effort  CV: Normal rate, regular rhythm, no murmurs, no peripheral edema  GI: Soft, nontender, nondistended, no masses, no hepatomegaly, no splenomegaly  MS: Normal gait and station. Digits without clubbing or cyanosis. Skin: No rashes, ecchymoses, or petechiae. Normal temperature, turgor, and texture.   Psych: Alert, oriented, appropriate affect, normal judgment/insight      Breasts:  Right breast with open would, bleeding, no erythema; healthy pink tissue, no necrotic tissue    Results:     Lab Results   Component Value Date/Time    WBC 11.6 (H) 02/18/2020 10:59 AM HGB 13.0 02/18/2020 10:59 AM    HCT 38.4 02/18/2020 10:59 AM    PLATELET 105 28/83/0418 10:59 AM    MCV 90.4 02/18/2020 10:59 AM    ABS. NEUTROPHILS 9.8 (H) 02/18/2020 10:59 AM     Lab Results   Component Value Date/Time    Sodium 139 01/14/2013 12:46 PM    Potassium 4.0 01/14/2013 12:46 PM    Chloride 103 01/14/2013 12:46 PM    CO2 21 01/14/2013 12:46 PM    Glucose 80 01/14/2013 12:46 PM    BUN 12 01/14/2013 12:46 PM    Creatinine 0.68 01/14/2013 12:46 PM    GFR est  05/31/2011 03:48 PM    GFR est non- 01/14/2013 12:46 PM    Calcium 9.4 01/14/2013 12:46 PM     Lab Results   Component Value Date/Time    Bilirubin, total 0.3 01/14/2013 12:46 PM    ALT (SGPT) 13 01/14/2013 12:46 PM    AST (SGOT) 12 01/14/2013 12:46 PM    Alk. phosphatase 54 01/14/2013 12:46 PM    Protein, total 7.3 01/14/2013 12:46 PM    Albumin 4.6 01/14/2013 12:46 PM     10/17/19 breast MRI  DCIS at 12:00 8 cmfn      Records reviewed and summarized above. Pathology report(s) reviewed above. Radiology report(s) reviewed above. Assessment/plan:   1. 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 We explained why a patient can have microscopic cancer spread now even though physical examination, laboratory studies and imaging studies are negative for cancer. We explained that the same treatments used now as adjuvant or preventive treatments rarely if ever are curative in women who develop metastases. I discussed the potential risks of dose-dense chemotherapy with the patient. (DD AC-T, adriamycin 60 mg/m2; cyclophosphamide 600 mg/m2 q 2 weeks x 4; paclitaxel 80 mg/m2 qweekly x 12). Major toxicities include nausea and vomiting, stomatitis, fatigue, and a small risk of heart damage.  Anemia frequently results and occasionally requires growth factors and rarely transfusions. Neutropenic fever is uncommon, but can be a life-threatening problem. Also, there is a small but increased risk of myelodysplasia and acute leukemia. We provided the patient with detailed information concerning toxicity including frequent toxicities that only last a few days, such as nausea, vomiting, mouth sores, arthralgia, myalgia, and potentially allergic reactions to paclitaxel, as well as toxicities which can be longer lasting including total alopecia, fatigue, anemia and neuropathy. We provided the patient with detailed information concerning the toxicities of their regimen in addition to our verbal discussion. We discussed the toxicities of TC chemotherapy (docetaxel 75mg/m2/cyclophosphamide 600 mg/m2 q 3 weeks x 4)  in detail. This chemotherapy frequently causes a low white blood cell count and a small percent of patients require hospital admission for treatment of neutropenic fever. We explained that on occasion we consider the use of growth factors to minimize this risk. We explained to the patient that some side effects, if they occur, only last a few days including nausea, vomiting, stomatitis, arthralgia, myalgia, and allergic reactions to Taxotere. We told the patient that severe nausea and vomiting were very uncommon and that some side effects, if they occur, will last longer: this includes hair loss, which will be seen in all patients treated with these agents and fatigue, which will be seen in most. The patient was also told that if she is having menstrual function that she could develop chemotherapy-induced amenorrhea and infertility. We also told the patient that there was a very small risk of acute leukemia. We also informed the patient that heart damage is rare with these agents    Would use neulasta for TC. Using Predict!, there is a 2% OS difference between 2nd and 3rd gen chemo. OS benefit of chemo is 5-6%. For her, I recommend TC q 3 weeks x 4. Discussed cold caps and she is interested and was fitted. Also discussed oral and peripheral cryotherapy. She has done her own research and has decided on chemotherapy, but is concerned about missing her window due to her right breast wound. She is aware of the literature reporting improvement in [place] breast cancer survival if chemotherapy is started within 60 days from surgery. The absolute window would be 12 weeks from surgery. 60 days would be 2/18. I spoke to Dr. Nick Price and he is ok with proceeding with chemotherapy. Port placed by Dr. Eleazar Caruso on  2/17/20. She is agreeable to TC  q 3 weeks x 4. She has signed informed consent. Will proceed with cycle 1 today. The patient was given presciptions for a wig, emla cream, decadron to take 8 mg bid the day before and day after chemo, zofran and compazine. Neulasta the following day. 2. Emotional well being:  She has excellent support and is coping well with her disease    3. tob use:  Has quit    4. Right breast wound:  No longer infected, healing well, but open wound, see above    5. Russel Carrel age at diagnosis:  Genetic testing negative          I appreciate the opportunity to participate in Ms. Kenzie Irwin care. Signed By: Kathrin Hernández MD      No orders of the defined types were placed in this encounter.

## 2020-02-19 ENCOUNTER — APPOINTMENT (OUTPATIENT)
Dept: INFUSION THERAPY | Age: 45
End: 2020-02-19

## 2020-03-05 RX ORDER — BUTALBITAL, ACETAMINOPHEN AND CAFFEINE 50; 325; 40 MG/1; MG/1; MG/1
1 TABLET ORAL
Qty: 30 TAB | Refills: 0 | Status: SHIPPED | OUTPATIENT
Start: 2020-03-05

## 2020-03-10 ENCOUNTER — HOSPITAL ENCOUNTER (OUTPATIENT)
Dept: INFUSION THERAPY | Age: 45
Discharge: HOME | End: 2020-03-10
Payer: COMMERCIAL

## 2020-03-10 ENCOUNTER — OFFICE VISIT (OUTPATIENT)
Dept: ONCOLOGY | Age: 45
End: 2020-03-10

## 2020-03-10 VITALS
DIASTOLIC BLOOD PRESSURE: 58 MMHG | TEMPERATURE: 96.5 F | HEIGHT: 61 IN | HEART RATE: 76 BPM | SYSTOLIC BLOOD PRESSURE: 127 MMHG | BODY MASS INDEX: 40.42 KG/M2 | RESPIRATION RATE: 18 BRPM | WEIGHT: 214.1 LBS

## 2020-03-10 VITALS
TEMPERATURE: 97.4 F | DIASTOLIC BLOOD PRESSURE: 63 MMHG | RESPIRATION RATE: 18 BRPM | HEART RATE: 92 BPM | WEIGHT: 214 LBS | HEIGHT: 61 IN | OXYGEN SATURATION: 98 % | BODY MASS INDEX: 40.4 KG/M2 | SYSTOLIC BLOOD PRESSURE: 124 MMHG

## 2020-03-10 DIAGNOSIS — Z17.1 MALIGNANT NEOPLASM OF LEFT BREAST IN FEMALE, ESTROGEN RECEPTOR NEGATIVE, UNSPECIFIED SITE OF BREAST (HCC): Primary | ICD-10-CM

## 2020-03-10 DIAGNOSIS — Z17.1 MALIGNANT NEOPLASM OF CENTRAL PORTION OF LEFT BREAST IN FEMALE, ESTROGEN RECEPTOR NEGATIVE (HCC): Primary | ICD-10-CM

## 2020-03-10 DIAGNOSIS — C50.112 MALIGNANT NEOPLASM OF CENTRAL PORTION OF LEFT BREAST IN FEMALE, ESTROGEN RECEPTOR NEGATIVE (HCC): Primary | ICD-10-CM

## 2020-03-10 DIAGNOSIS — C50.912 MALIGNANT NEOPLASM OF LEFT BREAST IN FEMALE, ESTROGEN RECEPTOR NEGATIVE, UNSPECIFIED SITE OF BREAST (HCC): Primary | ICD-10-CM

## 2020-03-10 LAB
ALBUMIN SERPL-MCNC: 3.6 G/DL (ref 3.5–5)
ALBUMIN/GLOB SERPL: 0.9 {RATIO} (ref 1.1–2.2)
ALP SERPL-CCNC: 53 U/L (ref 45–117)
ALT SERPL-CCNC: 21 U/L (ref 12–78)
ANION GAP SERPL CALC-SCNC: 6 MMOL/L (ref 5–15)
AST SERPL-CCNC: 8 U/L (ref 15–37)
BASO+EOS+MONOS # BLD AUTO: 0.3 K/UL (ref 0.2–1.2)
BASO+EOS+MONOS NFR BLD AUTO: 3 % (ref 3.2–16.9)
BILIRUB SERPL-MCNC: 0.3 MG/DL (ref 0.2–1)
BUN SERPL-MCNC: 13 MG/DL (ref 6–20)
BUN/CREAT SERPL: 22 (ref 12–20)
CALCIUM SERPL-MCNC: 8.8 MG/DL (ref 8.5–10.1)
CHLORIDE SERPL-SCNC: 109 MMOL/L (ref 97–108)
CO2 SERPL-SCNC: 25 MMOL/L (ref 21–32)
CREAT SERPL-MCNC: 0.58 MG/DL (ref 0.55–1.02)
DIFFERENTIAL METHOD BLD: ABNORMAL
ERYTHROCYTE [DISTWIDTH] IN BLOOD BY AUTOMATED COUNT: 13.5 % (ref 11.8–15.8)
GLOBULIN SER CALC-MCNC: 3.8 G/DL (ref 2–4)
GLUCOSE SERPL-MCNC: 119 MG/DL (ref 65–100)
HCT VFR BLD AUTO: 38.6 % (ref 35–47)
HGB BLD-MCNC: 12.9 G/DL (ref 11.5–16)
LYMPHOCYTES # BLD: 1.1 K/UL (ref 0.8–3.5)
LYMPHOCYTES NFR BLD: 11 % (ref 12–49)
MCH RBC QN AUTO: 30.1 PG (ref 26–34)
MCHC RBC AUTO-ENTMCNC: 33.4 G/DL (ref 30–36.5)
MCV RBC AUTO: 90 FL (ref 80–99)
NEUTS SEG # BLD: 8.4 K/UL (ref 1.8–8)
NEUTS SEG NFR BLD: 86 % (ref 32–75)
PLATELET # BLD AUTO: 460 K/UL (ref 150–400)
POTASSIUM SERPL-SCNC: 3.5 MMOL/L (ref 3.5–5.1)
PROT SERPL-MCNC: 7.4 G/DL (ref 6.4–8.2)
RBC # BLD AUTO: 4.29 M/UL (ref 3.8–5.2)
SODIUM SERPL-SCNC: 140 MMOL/L (ref 136–145)
WBC # BLD AUTO: 9.8 K/UL (ref 3.6–11)

## 2020-03-10 PROCEDURE — 96375 TX/PRO/DX INJ NEW DRUG ADDON: CPT

## 2020-03-10 PROCEDURE — 96413 CHEMO IV INFUSION 1 HR: CPT

## 2020-03-10 PROCEDURE — 96377 APPLICATON ON-BODY INJECTOR: CPT

## 2020-03-10 PROCEDURE — 96415 CHEMO IV INFUSION ADDL HR: CPT

## 2020-03-10 PROCEDURE — 96361 HYDRATE IV INFUSION ADD-ON: CPT

## 2020-03-10 PROCEDURE — 74011250636 HC RX REV CODE- 250/636: Performed by: INTERNAL MEDICINE

## 2020-03-10 PROCEDURE — 80053 COMPREHEN METABOLIC PANEL: CPT

## 2020-03-10 PROCEDURE — 85025 COMPLETE CBC W/AUTO DIFF WBC: CPT

## 2020-03-10 PROCEDURE — 36415 COLL VENOUS BLD VENIPUNCTURE: CPT

## 2020-03-10 PROCEDURE — 77030012965 HC NDL HUBR BBMI -A

## 2020-03-10 RX ORDER — PALONOSETRON 0.05 MG/ML
0.25 INJECTION, SOLUTION INTRAVENOUS ONCE
Status: COMPLETED | OUTPATIENT
Start: 2020-03-10 | End: 2020-03-10

## 2020-03-10 RX ORDER — HEPARIN 100 UNIT/ML
300-500 SYRINGE INTRAVENOUS AS NEEDED
Status: ACTIVE | OUTPATIENT
Start: 2020-03-10 | End: 2020-03-10

## 2020-03-10 RX ORDER — SODIUM CHLORIDE 9 MG/ML
25 INJECTION, SOLUTION INTRAVENOUS CONTINUOUS
Status: DISCONTINUED | OUTPATIENT
Start: 2020-03-10 | End: 2020-03-11 | Stop reason: HOSPADM

## 2020-03-10 RX ORDER — SODIUM CHLORIDE 0.9 % (FLUSH) 0.9 %
10 SYRINGE (ML) INJECTION AS NEEDED
Status: DISPENSED | OUTPATIENT
Start: 2020-03-10 | End: 2020-03-10

## 2020-03-10 RX ORDER — DEXAMETHASONE SODIUM PHOSPHATE 100 MG/10ML
10 INJECTION INTRAMUSCULAR; INTRAVENOUS ONCE
Status: COMPLETED | OUTPATIENT
Start: 2020-03-10 | End: 2020-03-10

## 2020-03-10 RX ORDER — SODIUM CHLORIDE 9 MG/ML
10 INJECTION INTRAMUSCULAR; INTRAVENOUS; SUBCUTANEOUS AS NEEDED
Status: ACTIVE | OUTPATIENT
Start: 2020-03-10 | End: 2020-03-10

## 2020-03-10 RX ADMIN — Medication 500 UNITS: at 15:44

## 2020-03-10 RX ADMIN — SODIUM CHLORIDE 25 ML/HR: 900 INJECTION, SOLUTION INTRAVENOUS at 13:49

## 2020-03-10 RX ADMIN — Medication 10 ML: at 15:44

## 2020-03-10 RX ADMIN — SODIUM CHLORIDE 10 ML: 9 INJECTION INTRAMUSCULAR; INTRAVENOUS; SUBCUTANEOUS at 11:00

## 2020-03-10 RX ADMIN — DOCETAXEL ANHYDROUS 157 MG: 10 INJECTION, SOLUTION INTRAVENOUS at 13:58

## 2020-03-10 RX ADMIN — Medication 10 ML: at 11:00

## 2020-03-10 RX ADMIN — CYCLOPHOSPHAMIDE 1254 MG: 1 INJECTION, POWDER, FOR SOLUTION INTRAVENOUS; ORAL at 15:07

## 2020-03-10 RX ADMIN — DEXAMETHASONE SODIUM PHOSPHATE 10 MG: 10 INJECTION INTRAMUSCULAR; INTRAVENOUS at 12:56

## 2020-03-10 RX ADMIN — SODIUM CHLORIDE 500 ML: 900 INJECTION, SOLUTION INTRAVENOUS at 12:54

## 2020-03-10 RX ADMIN — PEGFILGRASTIM 6 MG: KIT SUBCUTANEOUS at 15:47

## 2020-03-10 RX ADMIN — PALONOSETRON HYDROCHLORIDE 0.25 MG: 0.25 INJECTION INTRAVENOUS at 12:56

## 2020-03-10 NOTE — PROGRESS NOTES
Prior to cooling the patient's hair was dampened with water and hair conditioner was applied to improve scalp contact and reduce the insulation effect of hair. Pre-cooling time: Cold cap therapy initiated 30 minutes prior to Taxotere/Cytoxan. Infusion time: 1358 to 1537    Post infusion cooling time: Cold cap therapy continued 90 minutes post Taxotere/Cytoxan infusion.

## 2020-03-10 NOTE — PROGRESS NOTES
[facility] Progress Note    Date: March 10, 2020    Name: Lizbet Bill    MRN: [id number]         : 1975    Ms. Barrera Arrived ambulatory and in no distress for C2D1 of taxotere/cytoxan Regimen. Assessment was completed, no acute issues at this time, no new complaints voiced. chest wall port accessed without difficulty, labs drawn & sent for processing. Chemotherapy Flowsheet 3/10/2020   Cycle C2D1   Date 3/10/2020   Drug / Regimen Taxotere/cytoxan   Pre Hydration given   Pre Meds given   Notes given        Patient proceed to appointment with Dr. Madeleine Armendariz. Ms. Natalie Roman vitals were reviewed. Visit Vitals  /58   Pulse 76   Temp 96.5 °F (35.8 °C)   Resp 18   Ht 5' 1\" (1.549 m)   Wt 97.1 kg (214 lb 1.6 oz)   Breastfeeding No   BMI 40.45 kg/m²       Lab results were obtained and reviewed. Recent Results (from the past 12 hour(s))   CBC WITH 3 PART DIFF    Collection Time: 03/10/20 11:24 AM   Result Value Ref Range    WBC 9.8 3.6 - 11.0 K/uL    RBC 4.29 3.80 - 5.20 M/uL    HGB 12.9 11.5 - 16.0 g/dL    HCT 38.6 35.0 - 47.0 %    MCV 90.0 80.0 - 99.0 FL    MCH 30.1 26.0 - 34.0 PG    MCHC 33.4 30.0 - 36.5 g/dL    RDW 13.5 11.8 - 15.8 %    PLATELET 478 (H) 830 - 400 K/uL    NEUTROPHILS 86 (H) 32 - 75 %    MIXED CELLS 3 (L) 3.2 - 16.9 %    LYMPHOCYTES 11 (L) 12 - 49 %    ABS. NEUTROPHILS 8.4 (H) 1.8 - 8.0 K/UL    ABS. MIXED CELLS 0.3 0.2 - 1.2 K/uL    ABS.  LYMPHOCYTES 1.1 0.8 - 3.5 K/UL    DF AUTOMATED     METABOLIC PANEL, COMPREHENSIVE    Collection Time: 03/10/20 11:24 AM   Result Value Ref Range    Sodium 140 136 - 145 mmol/L    Potassium 3.5 3.5 - 5.1 mmol/L    Chloride 109 (H) 97 - 108 mmol/L    CO2 25 21 - 32 mmol/L    Anion gap 6 5 - 15 mmol/L    Glucose 119 (H) 65 - 100 mg/dL    BUN 13 6 - 20 MG/DL    Creatinine 0.58 0.55 - 1.02 MG/DL    BUN/Creatinine ratio 22 (H) 12 - 20      GFR est AA >60 >60 ml/min/1.73m2    GFR est non-AA >60 >60 ml/min/1.73m2    Calcium 8.8 8.5 - 10.1 MG/DL    Bilirubin, total 0.3 0.2 - 1.0 MG/DL    ALT (SGPT) 21 12 - 78 U/L    AST (SGOT) 8 (L) 15 - 37 U/L    Alk.  phosphatase 53 45 - 117 U/L    Protein, total 7.4 6.4 - 8.2 g/dL    Albumin 3.6 3.5 - 5.0 g/dL    Globulin 3.8 2.0 - 4.0 g/dL    A-G Ratio 0.9 (L) 1.1 - 2.2       Cold Cap therapy initiated  Medications:  Medications Administered     0.9% sodium chloride infusion     Admin Date  03/10/2020 Action  New Bag Dose  25 mL/hr Rate  25 mL/hr Route  IntraVENous Administered By  Abigail France RN          0.9% sodium chloride injection 10 mL     Admin Date  03/10/2020 Action  Given Dose  10 mL Route  IntraVENous Administered By  Abigail France RN          cyclophosphamide (CYTOXAN) 1,254 mg in 0.9% sodium chloride 250 mL, overfill volume 25 mL chemo infusion     Admin Date  03/10/2020 Action  New Bag Dose  1254 mg Rate  675.4 mL/hr Route  IntraVENous Administered By  Abigail France RN          dexamethasone (DECADRON) injection 10 mg     Admin Date  03/10/2020 Action  Given Dose  10 mg Route  IntraVENous Administered By  Channie Goltz, RN          DOCEtaxeL (TAXOTERE) 157 mg in 0.9% sodium chloride 250 mL, overfill volume 25 mL chemo infusion     Admin Date  03/10/2020 Action  New Bag Dose  157 mg Route  IntraVENous Administered By  Abigail France RN          heparin (porcine) pf 300-500 Units     Admin Date  03/10/2020 Action  Given Dose  500 Units Route  InterCATHeter Administered By  Abigail France RN          palonosetron HCl (ALOXI) injection 0.25 mg     Admin Date  03/10/2020 Action  Given Dose  0.25 mg Route  IntraVENous Administered By  Channie Goltz, RN          pegfilgrastim (NEULASTA) wearable SQ injector 6 mg     Admin Date  03/10/2020 Action  Given Dose  6 mg Route  SubCUTAneous Administered By  Abigail France RN          saline peripheral flush soln 10 mL     Admin Date  03/10/2020 Action  Given Dose  10 mL Route  InterCATHeter Administered By  Abigail France RN Admin Date  03/10/2020 Action  Given Dose  10 mL Route  InterCATHeter Administered By  Tara Mcallister RN          sodium chloride 0.9 % bolus infusion 500 mL     Admin Date  03/10/2020 Action  New Bag Dose  500 mL Route  IntraVENous Administered By  Taj Cisneros RN                  Ms. Tabby Solano tolerated treatment well and was discharged from [place] [address] in stable condition. Port de-accessed, flushed & heparinized per protocol. She is to return on March 31 at 1130 for her next appointment.     Joe Luna RN  March 10, 2020

## 2020-03-10 NOTE — PROGRESS NOTES
[facility] [place] at [place] [address] [address] [facility] [address] [facility]  Mik Gilliam: [phone]  F: [phone]      Reason for Visit:   Conor Menezes is a 38 y.o. female who is seen in consultation at the request of Dr. Domingo Sampson for evaluation of therapy for breast cancer (he called and asked if I could see her)    Surgeon:  Dr. Ashia Garcia surgeon:  Dr. Jaida Fragoso    Treatment History:   · 10/2/19 left breast core bx:  DCIS gr 2-3, ER negative, MD negative  · 19 left breast lumpectomy:  IDC, gr 3, 8 mm, ER negative, MD negative, HER 2 negative at IHC 0, Ki-67 60%, 2 mm DCIS, gr 3, 0/3 LN, pT1b pN0 cM0  · [employer] genetic testing:  Negative  · TC 20-    History of Present Illness: An abnormal mammogram led to the pathology above. Interval history:  In today for follow up. Complains of gr 3 loss of appetite, gr 1 fatigue, gr 1 nausea, 8-9/10 all over bone pain. Started Thursday night, could not move for 4 days,  gr 2-3 headache, lasted 6 days. FH:  Paternal Great aunts (two of them), 53-62 and 64-70 years old; maternal great aunt in her 76s; no ovarian, advanced prostate cancer, or pancreas cancer    LMP 2020    Past Medical History:   Diagnosis Date    Headache     Liver tumor (benign)       Past Surgical History:   Procedure Laterality Date    HX  SECTION      x 2    MD HISTORY NEW OR CHANGING MOLES      pre-cancerous       Social History     Tobacco Use    Smoking status: Former Smoker     Last attempt to quit: 5/10/2008     Years since quittin.8    Smokeless tobacco: Never Used   Substance Use Topics    Alcohol use: Yes     Comment: rarely      Family History   Problem Relation Age of Onset    Thyroid Disease Mother     Thyroid Disease Sister      Current Outpatient Medications   Medication Sig    butalbital-acetaminophen-caffeine (FIORICET, ESGIC) -40 mg per tablet Take 1 Tab by mouth every four (4) hours as needed for Headache.  ondansetron hcl (ZOFRAN) 8 mg tablet Take 1 Tab by mouth every eight (8) hours as needed for Nausea.  prochlorperazine (COMPAZINE) 10 mg tablet Take 1 Tab by mouth every six (6) hours as needed for Nausea.  lidocaine-prilocaine (EMLA) topical cream Apply  to affected area as needed for Pain.  dexAMETHasone (DECADRON) 4 mg tablet 8 mg PO bid the day before and day after chemo    escitalopram oxalate (LEXAPRO) 5 mg tablet Take  by mouth daily.  SUMAtriptan (IMITREX) 50 mg tablet Take 50 mg by mouth once as needed for Migraine. No current facility-administered medications for this visit. Facility-Administered Medications Ordered in Other Visits   Medication Dose Route Frequency    saline peripheral flush soln 10 mL  10 mL InterCATHeter PRN    0.9% sodium chloride injection 10 mL  10 mL IntraVENous PRN    heparin (porcine) pf 300-500 Units  300-500 Units InterCATHeter PRN      Allergies   Allergen Reactions    Sulfa (Sulfonamide Antibiotics) Hives        Review of Systems: A complete review of systems was obtained, negative except as described above. Physical Exam:     Visit Vitals  /63 (BP 1 Location: Left arm, BP Patient Position: Sitting)   Pulse 92   Temp 97.4 °F (36.3 °C) (Temporal)   Resp 18   Ht 5' 1\" (1.549 m)   Wt 214 lb (97.1 kg)   SpO2 98%   BMI 40.43 kg/m²     ECOG PS: 0  General: No distress  Eyes: PERRLA, anicteric sclerae  HENT: Atraumatic, OP clear  Neck: Supple  Lymphatic: No cervical, supraclavicular, or inguinal adenopathy  Respiratory: CTAB, normal respiratory effort  CV: Normal rate, regular rhythm, no murmurs, no peripheral edema  GI: Soft, nontender, nondistended, no masses, no hepatomegaly, no splenomegaly  MS: Normal gait and station. Digits without clubbing or cyanosis. Skin: No rashes, ecchymoses, or petechiae. Normal temperature, turgor, and texture.   Psych: Alert, oriented, appropriate affect, normal judgment/insight      Breasts:  Right breast with open would, improved    Results:     Lab Results   Component Value Date/Time    WBC 9.8 03/10/2020 11:24 AM    HGB 12.9 03/10/2020 11:24 AM    HCT 38.6 03/10/2020 11:24 AM    PLATELET 152 (H) 89/52/6987 11:24 AM    MCV 90.0 03/10/2020 11:24 AM    ABS. NEUTROPHILS 8.4 (H) 03/10/2020 11:24 AM     Lab Results   Component Value Date/Time    Sodium 142 02/18/2020 10:59 AM    Potassium 3.1 (L) 02/18/2020 10:59 AM    Chloride 110 (H) 02/18/2020 10:59 AM    CO2 24 02/18/2020 10:59 AM    Glucose 165 (H) 02/18/2020 10:59 AM    BUN 11 02/18/2020 10:59 AM    Creatinine 0.70 02/18/2020 10:59 AM    GFR est AA >60 02/18/2020 10:59 AM    GFR est non-AA >60 02/18/2020 10:59 AM    Calcium 8.4 (L) 02/18/2020 10:59 AM     Lab Results   Component Value Date/Time    Bilirubin, total 0.1 (L) 02/18/2020 10:59 AM    ALT (SGPT) 19 02/18/2020 10:59 AM    AST (SGOT) 8 (L) 02/18/2020 10:59 AM    Alk. phosphatase 44 (L) 02/18/2020 10:59 AM    Protein, total 6.6 02/18/2020 10:59 AM    Albumin 3.4 (L) 02/18/2020 10:59 AM    Globulin 3.2 02/18/2020 10:59 AM     10/17/19 breast MRI  [facility] at 12:00 [facility]      Records reviewed and summarized above. Pathology report(s) reviewed above. Radiology report(s) reviewed above. Assessment/plan:   1. 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 We explained why a patient can have microscopic cancer spread now even though physical examination, laboratory studies and imaging studies are negative for cancer. We explained that the same treatments used now as adjuvant or preventive treatments rarely if ever are curative in women who develop metastases. I discussed the potential risks of dose-dense chemotherapy with the patient.   (DD AC-T, adriamycin 60 mg/m2; cyclophosphamide 600 mg/m2 q 2 weeks x 4; paclitaxel 80 mg/m2 qweekly x 12). Major toxicities include nausea and vomiting, stomatitis, fatigue, and a small risk of heart damage. Anemia frequently results and occasionally requires growth factors and rarely transfusions. Neutropenic fever is uncommon, but can be a life-threatening problem. Also, there is a small but increased risk of myelodysplasia and acute leukemia. We provided the patient with detailed information concerning toxicity including frequent toxicities that only last a few days, such as nausea, vomiting, mouth sores, arthralgia, myalgia, and potentially allergic reactions to paclitaxel, as well as toxicities which can be longer lasting including total alopecia, fatigue, anemia and neuropathy. We provided the patient with detailed information concerning the toxicities of their regimen in addition to our verbal discussion. We discussed the toxicities of TC chemotherapy (docetaxel 75mg/m2/cyclophosphamide 600 mg/m2 q 3 weeks x 4)  in detail. This chemotherapy frequently causes a low white blood cell count and a small percent of patients require hospital admission for treatment of neutropenic fever. We explained that on occasion we consider the use of growth factors to minimize this risk. We explained to the patient that some side effects, if they occur, only last a few days including nausea, vomiting, stomatitis, arthralgia, myalgia, and allergic reactions to Taxotere. We told the patient that severe nausea and vomiting were very uncommon and that some side effects, if they occur, will last longer: this includes hair loss, which will be seen in all patients treated with these agents and fatigue, which will be seen in most. The patient was also told that if she is having menstrual function that she could develop chemotherapy-induced amenorrhea and infertility. We also told the patient that there was a very small risk of acute leukemia.  We also informed the patient that heart damage is rare with these agents    Would use neulasta for TC. Using Predict!, there is a 2% OS difference between 2nd and 3rd gen chemo. OS benefit of chemo is 5-6%. For her, I recommend TC q 3 weeks x 4. Discussed cold caps and she is interested and was fitted. Also discussed oral and peripheral cryotherapy. She has done her own research and has decided on chemotherapy, but is concerned about missing her window due to her right breast wound. She is aware of the literature reporting improvement in [place] breast cancer survival if chemotherapy is started within 60 days from surgery. The absolute window would be 12 weeks from surgery. 60 days would be 2/18. I spoke to Dr. Shirley Villarreal and he is ok with proceeding with chemotherapy. Port placed by Dr. Gerson Simmons on  2/17/20. She is agreeable to TC  q 3 weeks x 4. She has signed informed consent. Will proceed with cycle 2 today. The patient was given presciptions for a wig, emla cream, decadron to take 8 mg bid the day before and day after chemo, zofran and compazine. Neulasta the following day. 2. Emotional well being:  She has excellent support and is coping well with her disease    3. tob use:  Has quit    4. Right breast wound:  No longer infected, healing well, but open wound, see above    5. [employer] age at diagnosis:  Genetic testing negative    6. Pain due to neulasta: Will have her take claritin every day, 2 advil wed night, thurs am, thurs night; 4 mg dex thurs am and maybe thurs pm if pain starts    7. Nausea:  Controlled with zofran, compazine    8. Migraine:  Has fioricet prn; has imitrex      I appreciate the opportunity to participate in Ms. Brenda Orozco care. Signed By: Iveth Ramirez MD      No orders of the defined types were placed in this encounter.

## 2020-03-10 NOTE — PROGRESS NOTES
Mariia Mojica is a 38 y.o. female Follow up for the Evaluation for Breast Cancer. 1. Have you been to the ER, urgent care clinic since your last visit? Hospitalized since your last visit? No    2. Have you seen or consulted any other health care providers outside of the [facility] since your last visit? Include any pap smears or colon screening.  No

## 2020-03-10 NOTE — PATIENT INSTRUCTIONS
claritin every day    2 advil wed night, 2 advil thurs am, 2 advil thurs pm    4 mg dex thurs am and maybe thurs pm if pain starts    May take lorazepam to sleep

## 2020-03-11 ENCOUNTER — APPOINTMENT (OUTPATIENT)
Dept: INFUSION THERAPY | Age: 45
End: 2020-03-11

## 2020-03-31 ENCOUNTER — OFFICE VISIT (OUTPATIENT)
Dept: ONCOLOGY | Age: 45
End: 2020-03-31

## 2020-03-31 ENCOUNTER — HOSPITAL ENCOUNTER (OUTPATIENT)
Dept: INFUSION THERAPY | Age: 45
Discharge: HOME | End: 2020-03-31
Payer: COMMERCIAL

## 2020-03-31 VITALS
DIASTOLIC BLOOD PRESSURE: 80 MMHG | SYSTOLIC BLOOD PRESSURE: 117 MMHG | HEART RATE: 94 BPM | RESPIRATION RATE: 18 BRPM | TEMPERATURE: 97.5 F | HEIGHT: 61 IN | WEIGHT: 215 LBS | BODY MASS INDEX: 40.59 KG/M2 | OXYGEN SATURATION: 97 %

## 2020-03-31 VITALS
BODY MASS INDEX: 40.75 KG/M2 | HEIGHT: 61 IN | WEIGHT: 215.8 LBS | TEMPERATURE: 96.1 F | OXYGEN SATURATION: 97 % | RESPIRATION RATE: 18 BRPM | DIASTOLIC BLOOD PRESSURE: 65 MMHG | HEART RATE: 83 BPM | SYSTOLIC BLOOD PRESSURE: 131 MMHG

## 2020-03-31 DIAGNOSIS — Z17.1 MALIGNANT NEOPLASM OF CENTRAL PORTION OF LEFT BREAST IN FEMALE, ESTROGEN RECEPTOR NEGATIVE (HCC): Primary | ICD-10-CM

## 2020-03-31 DIAGNOSIS — C50.912 MALIGNANT NEOPLASM OF LEFT BREAST IN FEMALE, ESTROGEN RECEPTOR NEGATIVE, UNSPECIFIED SITE OF BREAST (HCC): Primary | ICD-10-CM

## 2020-03-31 DIAGNOSIS — Z17.1 MALIGNANT NEOPLASM OF LEFT BREAST IN FEMALE, ESTROGEN RECEPTOR NEGATIVE, UNSPECIFIED SITE OF BREAST (HCC): Primary | ICD-10-CM

## 2020-03-31 DIAGNOSIS — C50.112 MALIGNANT NEOPLASM OF CENTRAL PORTION OF LEFT BREAST IN FEMALE, ESTROGEN RECEPTOR NEGATIVE (HCC): Primary | ICD-10-CM

## 2020-03-31 LAB
ALBUMIN SERPL-MCNC: 3.6 G/DL (ref 3.5–5)
ALBUMIN/GLOB SERPL: 1.1 {RATIO} (ref 1.1–2.2)
ALP SERPL-CCNC: 53 U/L (ref 45–117)
ALT SERPL-CCNC: 35 U/L (ref 12–78)
ANION GAP SERPL CALC-SCNC: 5 MMOL/L (ref 5–15)
AST SERPL-CCNC: 11 U/L (ref 15–37)
BASO+EOS+MONOS # BLD AUTO: 0.7 K/UL (ref 0.2–1.2)
BASO+EOS+MONOS NFR BLD AUTO: 6 % (ref 3.2–16.9)
BILIRUB SERPL-MCNC: 0.3 MG/DL (ref 0.2–1)
BUN SERPL-MCNC: 10 MG/DL (ref 6–20)
BUN/CREAT SERPL: 17 (ref 12–20)
CALCIUM SERPL-MCNC: 9 MG/DL (ref 8.5–10.1)
CHLORIDE SERPL-SCNC: 110 MMOL/L (ref 97–108)
CO2 SERPL-SCNC: 25 MMOL/L (ref 21–32)
CREAT SERPL-MCNC: 0.58 MG/DL (ref 0.55–1.02)
DIFFERENTIAL METHOD BLD: ABNORMAL
ERYTHROCYTE [DISTWIDTH] IN BLOOD BY AUTOMATED COUNT: 14.8 % (ref 11.8–15.8)
GLOBULIN SER CALC-MCNC: 3.2 G/DL (ref 2–4)
GLUCOSE SERPL-MCNC: 103 MG/DL (ref 65–100)
HCT VFR BLD AUTO: 36.2 % (ref 35–47)
HGB BLD-MCNC: 12.3 G/DL (ref 11.5–16)
LYMPHOCYTES # BLD: 1 K/UL (ref 0.8–3.5)
LYMPHOCYTES NFR BLD: 9 % (ref 12–49)
MCH RBC QN AUTO: 30.8 PG (ref 26–34)
MCHC RBC AUTO-ENTMCNC: 34 G/DL (ref 30–36.5)
MCV RBC AUTO: 90.5 FL (ref 80–99)
NEUTS SEG # BLD: 9.5 K/UL (ref 1.8–8)
NEUTS SEG NFR BLD: 85 % (ref 32–75)
PLATELET # BLD AUTO: 259 K/UL (ref 150–400)
POTASSIUM SERPL-SCNC: 3.5 MMOL/L (ref 3.5–5.1)
PROT SERPL-MCNC: 6.8 G/DL (ref 6.4–8.2)
RBC # BLD AUTO: 4 M/UL (ref 3.8–5.2)
SODIUM SERPL-SCNC: 140 MMOL/L (ref 136–145)
WBC # BLD AUTO: 11.2 K/UL (ref 3.6–11)

## 2020-03-31 PROCEDURE — 74011250636 HC RX REV CODE- 250/636: Performed by: INTERNAL MEDICINE

## 2020-03-31 PROCEDURE — 96417 CHEMO IV INFUS EACH ADDL SEQ: CPT

## 2020-03-31 PROCEDURE — 96413 CHEMO IV INFUSION 1 HR: CPT

## 2020-03-31 PROCEDURE — 96375 TX/PRO/DX INJ NEW DRUG ADDON: CPT

## 2020-03-31 PROCEDURE — 96377 APPLICATON ON-BODY INJECTOR: CPT

## 2020-03-31 PROCEDURE — 77030012965 HC NDL HUBR BBMI -A

## 2020-03-31 PROCEDURE — 80053 COMPREHEN METABOLIC PANEL: CPT

## 2020-03-31 PROCEDURE — 96361 HYDRATE IV INFUSION ADD-ON: CPT

## 2020-03-31 PROCEDURE — 85025 COMPLETE CBC W/AUTO DIFF WBC: CPT

## 2020-03-31 PROCEDURE — 36415 COLL VENOUS BLD VENIPUNCTURE: CPT

## 2020-03-31 RX ORDER — SODIUM CHLORIDE 9 MG/ML
10 INJECTION INTRAMUSCULAR; INTRAVENOUS; SUBCUTANEOUS AS NEEDED
Status: ACTIVE | OUTPATIENT
Start: 2020-03-31 | End: 2020-03-31

## 2020-03-31 RX ORDER — HEPARIN 100 UNIT/ML
300-500 SYRINGE INTRAVENOUS AS NEEDED
Status: ACTIVE | OUTPATIENT
Start: 2020-03-31 | End: 2020-03-31

## 2020-03-31 RX ORDER — PALONOSETRON 0.05 MG/ML
0.25 INJECTION, SOLUTION INTRAVENOUS ONCE
Status: COMPLETED | OUTPATIENT
Start: 2020-03-31 | End: 2020-03-31

## 2020-03-31 RX ORDER — SODIUM CHLORIDE 0.9 % (FLUSH) 0.9 %
10 SYRINGE (ML) INJECTION AS NEEDED
Status: DISPENSED | OUTPATIENT
Start: 2020-03-31 | End: 2020-03-31

## 2020-03-31 RX ORDER — SODIUM CHLORIDE 9 MG/ML
25 INJECTION, SOLUTION INTRAVENOUS CONTINUOUS
Status: DISCONTINUED | OUTPATIENT
Start: 2020-03-31 | End: 2020-04-01 | Stop reason: HOSPADM

## 2020-03-31 RX ORDER — DEXAMETHASONE SODIUM PHOSPHATE 10 MG/ML
10 INJECTION INTRAMUSCULAR; INTRAVENOUS ONCE
Status: COMPLETED | OUTPATIENT
Start: 2020-03-31 | End: 2020-03-31

## 2020-03-31 RX ADMIN — CYCLOPHOSPHAMIDE 1254 MG: 1 INJECTION, POWDER, FOR SOLUTION INTRAVENOUS; ORAL at 14:15

## 2020-03-31 RX ADMIN — DEXAMETHASONE SODIUM PHOSPHATE 10 MG: 10 INJECTION INTRAMUSCULAR; INTRAVENOUS at 12:30

## 2020-03-31 RX ADMIN — DOCETAXEL ANHYDROUS 157 MG: 10 INJECTION, SOLUTION INTRAVENOUS at 13:10

## 2020-03-31 RX ADMIN — PEGFILGRASTIM 6 MG: KIT SUBCUTANEOUS at 15:05

## 2020-03-31 RX ADMIN — PALONOSETRON 0.25 MG: 0.05 INJECTION, SOLUTION INTRAVENOUS at 12:28

## 2020-03-31 RX ADMIN — SODIUM CHLORIDE 25 ML/HR: 900 INJECTION, SOLUTION INTRAVENOUS at 12:55

## 2020-03-31 RX ADMIN — SODIUM CHLORIDE 500 ML: 900 INJECTION, SOLUTION INTRAVENOUS at 12:25

## 2020-03-31 RX ADMIN — Medication 10 ML: at 15:10

## 2020-03-31 RX ADMIN — SODIUM CHLORIDE 10 ML: 9 INJECTION INTRAMUSCULAR; INTRAVENOUS; SUBCUTANEOUS at 11:16

## 2020-03-31 RX ADMIN — Medication 10 ML: at 11:15

## 2020-03-31 RX ADMIN — Medication 500 UNITS: at 15:10

## 2020-03-31 NOTE — PROGRESS NOTES
[facility] Progress Note    Date: 2020    Name: Stephanie Hobbs    MRN: [id number]         : 1975    Ms. Barrera Arrived ambulatory and in no distress for C3D1 of Taxotere/Cytoxan Regimen (Cold Cap). Assessment was completed, no acute issues at this time, no new complaints voiced. Right chest wall port accessed without difficulty, labs drawn & sent for processing. Confirmed pt took dexamethasone as prescribed. Patient proceed to appointment with Dr. Sandi Johnson. Ms. Lorene Chavez vitals were reviewed. Patient Vitals for the past 12 hrs:   Temp Pulse Resp BP SpO2   20 1114 97.5 °F (36.4 °C) 94 18 117/80 97 %       Pt lab results were reviewed. Medications:  Aloxi IVP  Decadron IVP  Taxotere IV  Cytoxan IV  Neulasta SQ on body    Prior to cooling the patient's hair was dampened with water and hair conditioner was applied to improve scalp contact and reduce the insulation effect of hair.      Pre-cooling time: Cold cap therapy initiated 30 minutes prior to Taxotere/Cytoxan. During the start of Cytoxan infusion pt accidentally unplugged [employer]ax[employer] machine from the wall. RN plugged machine back in and turned it on. Lyle Silva RN called [employer] help line and Diana instructed us on how to restart where we had left off. Cooling cap remained cold and was up and running within 5 minutes of being unplugged. Pt had to re-scan token. RN reported this to [employer] help line.     Post infusion cooling time: Cold cap therapy continued 90 minutes post Taxotere/Cytoxan infusion. Ms. Valerie Koenig tolerated treatment well and was discharged from [place] [address] in stable condition. Port de-accessed, flushed & heparinized per protocol. She is to return on 20 at 11:30 for her next appointment.     Yany Joiner RN  2020

## 2020-03-31 NOTE — PROGRESS NOTES
Everett Jacob is a 38 y.o. female here for follow up of breast cancer. 1. Have you been to the ER, urgent care clinic since your last visit?: No.  Hospitalized since your last visit?: No.    2. Have you seen or consulted any other health care providers outside of the [address] [place] [address] since your last visit?   Include any pap smears or colon screening.: No.

## 2020-03-31 NOTE — PROGRESS NOTES
[facility] [place] at [address], [address] [facility] [address] [facility]  Valentino Greenspan: [phone]  F: [phone]      Reason for Visit:   Gabrielle Lovelace is a 38 y.o. female who is seen in consultation at the request of Dr. Yoel Mclaughlin for evaluation of therapy for breast cancer (he called and asked if I could see her)    Surgeon:  Dr. Hillman Fothergill surgeon:  Dr. Triana Many    Treatment History:   · 10/2/19 left breast core bx:  DCIS gr 2-3, ER negative, ME negative  · 19 left breast lumpectomy:  IDC, gr 3, 8 mm, ER negative, ME negative, HER 2 negative at IHC 0, Ki-67 60%, 2 mm DCIS, gr 3, 0/3 LN, pT1b pN0 cM0  · [employer] genetic testing:  Negative  · TC 20-    History of Present Illness: An abnormal mammogram led to the pathology above. Interval history:  In today for follow up. Complains of gr 1 diarrhea, gr 1 fatigue, gr 1 nausea, gr 1 insomnia, gr 1 loss of cognition, gr 1 itching, gr 1 vaginal dryness    FH:  Paternal Great aunts (two of them), 53-62 and 64-70 years old; maternal great aunt in her 76s; no ovarian, advanced prostate cancer, or pancreas cancer    LMP 3/10/2020    Past Medical History:   Diagnosis Date    Headache     Liver tumor (benign)       Past Surgical History:   Procedure Laterality Date    HX  SECTION      x 2    ME HISTORY NEW OR CHANGING MOLES      pre-cancerous       Social History     Tobacco Use    Smoking status: Former Smoker     Last attempt to quit: 5/10/2008     Years since quittin.8    Smokeless tobacco: Never Used   Substance Use Topics    Alcohol use: Yes     Comment: rarely      Family History   Problem Relation Age of Onset    Thyroid Disease Mother     Thyroid Disease Sister      Current Outpatient Medications   Medication Sig    butalbital-acetaminophen-caffeine (FIORICET, ESGIC) -40 mg per tablet Take 1 Tab by mouth every four (4) hours as needed for Headache.     ondansetron hcl (ZOFRAN) 8 mg tablet Take 1 Tab by mouth every eight (8) hours as needed for Nausea.  prochlorperazine (COMPAZINE) 10 mg tablet Take 1 Tab by mouth every six (6) hours as needed for Nausea.  lidocaine-prilocaine (EMLA) topical cream Apply  to affected area as needed for Pain.  dexAMETHasone (DECADRON) 4 mg tablet 8 mg PO bid the day before and day after chemo    escitalopram oxalate (LEXAPRO) 5 mg tablet Take  by mouth daily.  SUMAtriptan (IMITREX) 50 mg tablet Take 50 mg by mouth once as needed for Migraine. No current facility-administered medications for this visit. Facility-Administered Medications Ordered in Other Visits   Medication Dose Route Frequency    saline peripheral flush soln 10 mL  10 mL InterCATHeter PRN    0.9% sodium chloride injection 10 mL  10 mL IntraVENous PRN    heparin (porcine) pf 300-500 Units  300-500 Units InterCATHeter PRN      Allergies   Allergen Reactions    Sulfa (Sulfonamide Antibiotics) Hives        Review of Systems: A complete review of systems was obtained, negative except as described above. Physical Exam:     Visit Vitals  /80   Pulse 94   Temp 97.5 °F (36.4 °C)   Resp 18   Ht 5' 1\" (1.549 m)   Wt 215 lb (97.5 kg)   LMP  (Approximate) Comment: Around 3/10, was very dark and only lasted three days per pt. SpO2 97%   BMI 40.62 kg/m²     ECOG PS: 0  General: No distress  Eyes: PERRLA, anicteric sclerae  HENT: Atraumatic, OP clear  Neck: Supple    Respiratory:  normal respiratory effort  CV: Normal rate, regular rhythm, no murmurs, no peripheral edema    MS: Normal gait and station. Digits without clubbing or cyanosis. Skin: No rashes, ecchymoses, or petechiae. Normal temperature, turgor, and texture.   Psych: Alert, oriented, appropriate affect, normal judgment/insight      Breasts:  Right breast with open would, improved    Results:     Lab Results   Component Value Date/Time    WBC 11.2 (H) 03/31/2020 11:30 AM    HGB 12.3 03/31/2020 11:30 AM    HCT 36.2 03/31/2020 11:30 AM    PLATELET 130 53/15/0336 11:30 AM    MCV 90.5 03/31/2020 11:30 AM    ABS. NEUTROPHILS 9.5 (H) 03/31/2020 11:30 AM     Lab Results   Component Value Date/Time    Sodium 140 03/10/2020 11:24 AM    Potassium 3.5 03/10/2020 11:24 AM    Chloride 109 (H) 03/10/2020 11:24 AM    CO2 25 03/10/2020 11:24 AM    Glucose 119 (H) 03/10/2020 11:24 AM    BUN 13 03/10/2020 11:24 AM    Creatinine 0.58 03/10/2020 11:24 AM    GFR est AA >60 03/10/2020 11:24 AM    GFR est non-AA >60 03/10/2020 11:24 AM    Calcium 8.8 03/10/2020 11:24 AM     Lab Results   Component Value Date/Time    Bilirubin, total 0.3 03/10/2020 11:24 AM    ALT (SGPT) 21 03/10/2020 11:24 AM    AST (SGOT) 8 (L) 03/10/2020 11:24 AM    Alk. phosphatase 53 03/10/2020 11:24 AM    Protein, total 7.4 03/10/2020 11:24 AM    Albumin 3.6 03/10/2020 11:24 AM    Globulin 3.8 03/10/2020 11:24 AM     10/17/19 breast MRI  DCIS at 12:00 8 cmfn      Records reviewed and summarized above. Pathology report(s) reviewed above. Radiology report(s) reviewed above. Assessment/plan:   1. 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 We explained why a patient can have microscopic cancer spread now even though physical examination, laboratory studies and imaging studies are negative for cancer. We explained that the same treatments used now as adjuvant or preventive treatments rarely if ever are curative in women who develop metastases. I discussed the potential risks of dose-dense chemotherapy with the patient. (DD AC-T, adriamycin 60 mg/m2; cyclophosphamide 600 mg/m2 q 2 weeks x 4; paclitaxel 80 mg/m2 qweekly x 12). Major toxicities include nausea and vomiting, stomatitis, fatigue, and a small risk of heart damage.  Anemia frequently results and occasionally requires growth factors and rarely transfusions. Neutropenic fever is uncommon, but can be a life-threatening problem. Also, there is a small but increased risk of myelodysplasia and acute leukemia. We provided the patient with detailed information concerning toxicity including frequent toxicities that only last a few days, such as nausea, vomiting, mouth sores, arthralgia, myalgia, and potentially allergic reactions to paclitaxel, as well as toxicities which can be longer lasting including total alopecia, fatigue, anemia and neuropathy. We provided the patient with detailed information concerning the toxicities of their regimen in addition to our verbal discussion. We discussed the toxicities of TC chemotherapy (docetaxel 75mg/m2/cyclophosphamide 600 mg/m2 q 3 weeks x 4)  in detail. This chemotherapy frequently causes a low white blood cell count and a small percent of patients require hospital admission for treatment of neutropenic fever. We explained that on occasion we consider the use of growth factors to minimize this risk. We explained to the patient that some side effects, if they occur, only last a few days including nausea, vomiting, stomatitis, arthralgia, myalgia, and allergic reactions to Taxotere. We told the patient that severe nausea and vomiting were very uncommon and that some side effects, if they occur, will last longer: this includes hair loss, which will be seen in all patients treated with these agents and fatigue, which will be seen in most. The patient was also told that if she is having menstrual function that she could develop chemotherapy-induced amenorrhea and infertility. We also told the patient that there was a very small risk of acute leukemia. We also informed the patient that heart damage is rare with these agents    Would use neulasta for TC. Using Predict!, there is a 2% OS difference between 2nd and 3rd gen chemo. OS benefit of chemo is 5-6%. For her, I recommend TC q 3 weeks x 4.   Discussed cold caps and she is interested and was fitted. Also discussed oral and peripheral cryotherapy. I spoke to Dr. Torito Chaney and he is ok with proceeding with chemotherapy. Port placed by Dr. Devyn Ordonez on  2/17/20. She is agreeable to TC  q 3 weeks x 4. She has signed informed consent. Will proceed with cycle 3 today. The patient was given presciptions for a wig, emla cream, decadron to take 8 mg bid the day before and day after chemo, zofran and compazine. Neulasta the following day. 2. Emotional well being:  She has excellent support and is coping well with her disease    3. tob use:  Has quit    4. Right breast wound:  No longer infected, healing well, but open wound, see above    5. [employer] age at diagnosis:  Genetic testing negative    6. Pain due to neulasta: Will have her take claritin every day, 2 advil wed night, thurs am, thurs night; 4 mg dex thurs am and maybe thurs pm if pain starts; improved with cycle 2    7. Nausea:  Controlled with zofran, compazine    8. Migraine:  Has fioricet prn; has imitrex    9. Itching:  otc benadryl ok      I appreciate the opportunity to participate in Ms. Samantha Jim care. Signed By: Gus Vance MD      No orders of the defined types were placed in this encounter.

## 2020-04-01 ENCOUNTER — APPOINTMENT (OUTPATIENT)
Dept: INFUSION THERAPY | Age: 45
End: 2020-04-01

## 2020-04-09 DIAGNOSIS — C50.112 MALIGNANT NEOPLASM OF CENTRAL PORTION OF LEFT BREAST IN FEMALE, ESTROGEN RECEPTOR NEGATIVE (HCC): Primary | ICD-10-CM

## 2020-04-09 DIAGNOSIS — Z17.1 MALIGNANT NEOPLASM OF CENTRAL PORTION OF LEFT BREAST IN FEMALE, ESTROGEN RECEPTOR NEGATIVE (HCC): Primary | ICD-10-CM

## 2020-04-09 RX ORDER — FLUCONAZOLE 150 MG/1
150 TABLET ORAL DAILY
Qty: 1 TAB | Refills: 0 | Status: SHIPPED | OUTPATIENT
Start: 2020-04-09 | End: 2020-04-10

## 2020-04-20 ENCOUNTER — PATIENT MESSAGE (OUTPATIENT)
Dept: ONCOLOGY | Age: 45
End: 2020-04-20

## 2020-04-21 ENCOUNTER — APPOINTMENT (OUTPATIENT)
Dept: INFUSION THERAPY | Age: 45
End: 2020-04-21

## 2020-04-21 ENCOUNTER — TELEPHONE (OUTPATIENT)
Dept: ONCOLOGY | Age: 45
End: 2020-04-21

## 2020-04-21 ENCOUNTER — HOSPITAL ENCOUNTER (OUTPATIENT)
Dept: INFUSION THERAPY | Age: 45
Discharge: HOME | End: 2020-04-21
Payer: COMMERCIAL

## 2020-04-21 ENCOUNTER — OFFICE VISIT (OUTPATIENT)
Dept: ONCOLOGY | Age: 45
End: 2020-04-21

## 2020-04-21 VITALS
WEIGHT: 217 LBS | SYSTOLIC BLOOD PRESSURE: 111 MMHG | HEIGHT: 61 IN | DIASTOLIC BLOOD PRESSURE: 63 MMHG | BODY MASS INDEX: 40.97 KG/M2 | HEART RATE: 73 BPM | TEMPERATURE: 96.3 F | RESPIRATION RATE: 18 BRPM

## 2020-04-21 VITALS
SYSTOLIC BLOOD PRESSURE: 122 MMHG | WEIGHT: 217 LBS | OXYGEN SATURATION: 98 % | HEIGHT: 61 IN | TEMPERATURE: 96.3 F | RESPIRATION RATE: 18 BRPM | HEART RATE: 102 BPM | DIASTOLIC BLOOD PRESSURE: 80 MMHG | BODY MASS INDEX: 40.97 KG/M2

## 2020-04-21 DIAGNOSIS — Z17.1 MALIGNANT NEOPLASM OF CENTRAL PORTION OF LEFT BREAST IN FEMALE, ESTROGEN RECEPTOR NEGATIVE (HCC): Primary | ICD-10-CM

## 2020-04-21 DIAGNOSIS — Z17.1 MALIGNANT NEOPLASM OF LEFT BREAST IN FEMALE, ESTROGEN RECEPTOR NEGATIVE, UNSPECIFIED SITE OF BREAST (HCC): Primary | ICD-10-CM

## 2020-04-21 DIAGNOSIS — C50.912 MALIGNANT NEOPLASM OF LEFT BREAST IN FEMALE, ESTROGEN RECEPTOR NEGATIVE, UNSPECIFIED SITE OF BREAST (HCC): Primary | ICD-10-CM

## 2020-04-21 DIAGNOSIS — C50.112 MALIGNANT NEOPLASM OF CENTRAL PORTION OF LEFT BREAST IN FEMALE, ESTROGEN RECEPTOR NEGATIVE (HCC): Primary | ICD-10-CM

## 2020-04-21 LAB
ALBUMIN SERPL-MCNC: 3.5 G/DL (ref 3.5–5)
ALBUMIN/GLOB SERPL: 1.1 {RATIO} (ref 1.1–2.2)
ALP SERPL-CCNC: 51 U/L (ref 45–117)
ALT SERPL-CCNC: 34 U/L (ref 12–78)
ANION GAP SERPL CALC-SCNC: 8 MMOL/L (ref 5–15)
AST SERPL-CCNC: 14 U/L (ref 15–37)
BASO+EOS+MONOS # BLD AUTO: 0.4 K/UL (ref 0.2–1.2)
BASO+EOS+MONOS NFR BLD AUTO: 4 % (ref 3.2–16.9)
BILIRUB SERPL-MCNC: 0.3 MG/DL (ref 0.2–1)
BUN SERPL-MCNC: 9 MG/DL (ref 6–20)
BUN/CREAT SERPL: 17 (ref 12–20)
CALCIUM SERPL-MCNC: 8.9 MG/DL (ref 8.5–10.1)
CHLORIDE SERPL-SCNC: 109 MMOL/L (ref 97–108)
CO2 SERPL-SCNC: 24 MMOL/L (ref 21–32)
CREAT SERPL-MCNC: 0.54 MG/DL (ref 0.55–1.02)
DIFFERENTIAL METHOD BLD: ABNORMAL
ERYTHROCYTE [DISTWIDTH] IN BLOOD BY AUTOMATED COUNT: 16.1 % (ref 11.8–15.8)
GLOBULIN SER CALC-MCNC: 3.2 G/DL (ref 2–4)
GLUCOSE SERPL-MCNC: 98 MG/DL (ref 65–100)
HCT VFR BLD AUTO: 36.1 % (ref 35–47)
HGB BLD-MCNC: 12 G/DL (ref 11.5–16)
LYMPHOCYTES # BLD: 1.1 K/UL (ref 0.8–3.5)
LYMPHOCYTES NFR BLD: 11 % (ref 12–49)
MCH RBC QN AUTO: 30.8 PG (ref 26–34)
MCHC RBC AUTO-ENTMCNC: 33.2 G/DL (ref 30–36.5)
MCV RBC AUTO: 92.6 FL (ref 80–99)
NEUTS SEG # BLD: 8.3 K/UL (ref 1.8–8)
NEUTS SEG NFR BLD: 85 % (ref 32–75)
PLATELET # BLD AUTO: 260 K/UL (ref 150–400)
POTASSIUM SERPL-SCNC: 3.6 MMOL/L (ref 3.5–5.1)
PROT SERPL-MCNC: 6.7 G/DL (ref 6.4–8.2)
RBC # BLD AUTO: 3.9 M/UL (ref 3.8–5.2)
SODIUM SERPL-SCNC: 141 MMOL/L (ref 136–145)
WBC # BLD AUTO: 9.8 K/UL (ref 3.6–11)

## 2020-04-21 PROCEDURE — 77030016057 HC NDL HUBR APOL -B

## 2020-04-21 PROCEDURE — 96375 TX/PRO/DX INJ NEW DRUG ADDON: CPT

## 2020-04-21 PROCEDURE — 80053 COMPREHEN METABOLIC PANEL: CPT

## 2020-04-21 PROCEDURE — 96413 CHEMO IV INFUSION 1 HR: CPT

## 2020-04-21 PROCEDURE — 74011250636 HC RX REV CODE- 250/636: Performed by: INTERNAL MEDICINE

## 2020-04-21 PROCEDURE — 96417 CHEMO IV INFUS EACH ADDL SEQ: CPT

## 2020-04-21 PROCEDURE — 36415 COLL VENOUS BLD VENIPUNCTURE: CPT

## 2020-04-21 PROCEDURE — 85025 COMPLETE CBC W/AUTO DIFF WBC: CPT

## 2020-04-21 PROCEDURE — 96361 HYDRATE IV INFUSION ADD-ON: CPT

## 2020-04-21 PROCEDURE — 74011000250 HC RX REV CODE- 250: Performed by: INTERNAL MEDICINE

## 2020-04-21 PROCEDURE — 96377 APPLICATON ON-BODY INJECTOR: CPT

## 2020-04-21 RX ORDER — PALONOSETRON 0.05 MG/ML
0.25 INJECTION, SOLUTION INTRAVENOUS ONCE
Status: COMPLETED | OUTPATIENT
Start: 2020-04-21 | End: 2020-04-21

## 2020-04-21 RX ORDER — SODIUM CHLORIDE 9 MG/ML
10 INJECTION INTRAMUSCULAR; INTRAVENOUS; SUBCUTANEOUS AS NEEDED
Status: ACTIVE | OUTPATIENT
Start: 2020-04-21 | End: 2020-04-21

## 2020-04-21 RX ORDER — HEPARIN 100 UNIT/ML
300-500 SYRINGE INTRAVENOUS AS NEEDED
Status: ACTIVE | OUTPATIENT
Start: 2020-04-21 | End: 2020-04-21

## 2020-04-21 RX ORDER — SODIUM CHLORIDE 9 MG/ML
25 INJECTION, SOLUTION INTRAVENOUS CONTINUOUS
Status: DISCONTINUED | OUTPATIENT
Start: 2020-04-21 | End: 2020-04-22 | Stop reason: HOSPADM

## 2020-04-21 RX ORDER — DEXAMETHASONE SODIUM PHOSPHATE 10 MG/ML
10 INJECTION INTRAMUSCULAR; INTRAVENOUS ONCE
Status: COMPLETED | OUTPATIENT
Start: 2020-04-21 | End: 2020-04-21

## 2020-04-21 RX ORDER — DEXAMETHASONE SODIUM PHOSPHATE 100 MG/10ML
10 INJECTION INTRAMUSCULAR; INTRAVENOUS ONCE
Status: DISCONTINUED | OUTPATIENT
Start: 2020-04-21 | End: 2020-04-21

## 2020-04-21 RX ORDER — FLUCONAZOLE 100 MG/1
TABLET ORAL
Qty: 8 TAB | Refills: 0 | Status: SHIPPED | OUTPATIENT
Start: 2020-04-21 | End: 2021-03-03

## 2020-04-21 RX ORDER — SODIUM CHLORIDE 0.9 % (FLUSH) 0.9 %
10 SYRINGE (ML) INJECTION AS NEEDED
Status: DISPENSED | OUTPATIENT
Start: 2020-04-21 | End: 2020-04-21

## 2020-04-21 RX ADMIN — PEGFILGRASTIM 6 MG: KIT SUBCUTANEOUS at 15:28

## 2020-04-21 RX ADMIN — CYCLOPHOSPHAMIDE 1254 MG: 1 INJECTION, POWDER, FOR SOLUTION INTRAVENOUS; ORAL at 14:45

## 2020-04-21 RX ADMIN — Medication 10 ML: at 11:38

## 2020-04-21 RX ADMIN — DEXAMETHASONE SODIUM PHOSPHATE 10 MG: 10 INJECTION INTRAMUSCULAR; INTRAVENOUS at 12:45

## 2020-04-21 RX ADMIN — SODIUM CHLORIDE 25 ML/HR: 900 INJECTION, SOLUTION INTRAVENOUS at 12:43

## 2020-04-21 RX ADMIN — SODIUM CHLORIDE 500 ML: 900 INJECTION, SOLUTION INTRAVENOUS at 12:50

## 2020-04-21 RX ADMIN — SODIUM CHLORIDE 10 ML: 9 INJECTION, SOLUTION INTRAMUSCULAR; INTRAVENOUS; SUBCUTANEOUS at 12:44

## 2020-04-21 RX ADMIN — SODIUM CHLORIDE 10 ML: 9 INJECTION, SOLUTION INTRAMUSCULAR; INTRAVENOUS; SUBCUTANEOUS at 11:38

## 2020-04-21 RX ADMIN — DOCETAXEL ANHYDROUS 157 MG: 10 INJECTION, SOLUTION INTRAVENOUS at 13:25

## 2020-04-21 RX ADMIN — Medication 10 ML: at 15:27

## 2020-04-21 RX ADMIN — SODIUM CHLORIDE 10 ML: 9 INJECTION, SOLUTION INTRAMUSCULAR; INTRAVENOUS; SUBCUTANEOUS at 12:45

## 2020-04-21 RX ADMIN — PALONOSETRON 0.25 MG: 0.25 INJECTION, SOLUTION INTRAVENOUS at 12:44

## 2020-04-21 RX ADMIN — HEPARIN 500 UNITS: 100 SYRINGE at 15:27

## 2020-04-21 NOTE — PROGRESS NOTES
[facility] [place] at [address], [address] [facility] 1007 [facility]  Debora [place]: [phone]  F: [phone]      Reason for Visit:   Bryan Delgado is a 38 y.o. female who is seen in consultation at the request of Dr. Rich Westfall for evaluation of therapy for breast cancer (he called and asked if I could see her)    Surgeon:  Dr. Deanna Barrow surgeon:  Dr. Nicole Gary    Treatment History:   · 10/2/19 left breast core bx:  DCIS gr 2-3, ER negative, LA negative  · 19 left breast lumpectomy:  IDC, gr 3, 8 mm, ER negative, LA negative, HER 2 negative at IHC 0, Ki-67 60%, 2 mm DCIS, gr 3, 0/3 LN, pT1b pN0 cM0  · [employer] genetic testing:  Negative  · TC     History of Present Illness: An abnormal mammogram led to the pathology above. Interval history:  In today for follow up. Complains of gr 1 fatigue. Reports she still has a yeast infection. FH:  Paternal Great aunts (two of them), 53-62 and 64-70 years old; maternal great aunt in her 76s; no ovarian, advanced prostate cancer, or pancreas cancer    LMP 3/10/2020    Past Medical History:   Diagnosis Date    Cancer ([address]     Headache     Liver tumor (benign)       Past Surgical History:   Procedure Laterality Date    HX  SECTION      x 2    LA HISTORY NEW OR CHANGING MOLES      pre-cancerous       Social History     Tobacco Use    Smoking status: Former Smoker     Last attempt to quit: 5/10/2008     Years since quittin.9    Smokeless tobacco: Never Used   Substance Use Topics    Alcohol use: Yes     Comment: rarely      Family History   Problem Relation Age of Onset    Thyroid Disease Mother     Thyroid Disease Sister      Current Outpatient Medications   Medication Sig    fluconazole (DIFLUCAN) 100 mg tablet Please take 200 mg on day 1 and 100 mg on days 2-7. FDA advises cautious prescribing of oral fluconazole in pregnancy.     butalbital-acetaminophen-caffeine (Tasha Fu) -40 mg per tablet Take 1 Tab by mouth every four (4) hours as needed for Headache.  ondansetron hcl (ZOFRAN) 8 mg tablet Take 1 Tab by mouth every eight (8) hours as needed for Nausea.  prochlorperazine (COMPAZINE) 10 mg tablet Take 1 Tab by mouth every six (6) hours as needed for Nausea.  lidocaine-prilocaine (EMLA) topical cream Apply  to affected area as needed for Pain.  dexAMETHasone (DECADRON) 4 mg tablet 8 mg PO bid the day before and day after chemo    escitalopram oxalate (LEXAPRO) 5 mg tablet Take  by mouth daily.  SUMAtriptan (IMITREX) 50 mg tablet Take 50 mg by mouth once as needed for Migraine. No current facility-administered medications for this visit. Facility-Administered Medications Ordered in Other Visits   Medication Dose Route Frequency    saline peripheral flush soln 10 mL  10 mL InterCATHeter PRN    0.9% sodium chloride injection 10 mL  10 mL IntraVENous PRN    heparin (porcine) pf 300-500 Units  300-500 Units InterCATHeter PRN      Allergies   Allergen Reactions    Sulfa (Sulfonamide Antibiotics) Hives        Review of Systems: A complete review of systems was obtained, negative except as described above. Physical Exam:     Visit Vitals  /80 (BP 1 Location: Left arm, BP Patient Position: Sitting)   Pulse (!) 102   Temp 96.3 °F (35.7 °C) (Temporal)   Resp 18   Ht 5' 1\" (1.549 m)   Wt 217 lb (98.4 kg)   SpO2 98%   BMI 41.00 kg/m²     ECOG PS: 0  General: No distress  Respiratory: Normal respiratory effort  CV: No peripheral edema  Skin: rash/fungal noted to medial part of right breast, ecchymoses, or petechiae  Psych: Alert, oriented, normal mood/affect  Breasts:  Right breast with open would, improved    Results:     Lab Results   Component Value Date/Time    WBC 9.8 04/21/2020 11:25 AM    HGB 12.0 04/21/2020 11:25 AM    HCT 36.1 04/21/2020 11:25 AM    PLATELET 005 87/41/1446 11:25 AM    MCV 92.6 04/21/2020 11:25 AM    ABS.  NEUTROPHILS 8.3 (H) 04/21/2020 11:25 AM     Lab Results   Component Value Date/Time    Sodium 140 03/31/2020 11:21 AM    Potassium 3.5 03/31/2020 11:21 AM    Chloride 110 (H) 03/31/2020 11:21 AM    CO2 25 03/31/2020 11:21 AM    Glucose 103 (H) 03/31/2020 11:21 AM    BUN 10 03/31/2020 11:21 AM    Creatinine 0.58 03/31/2020 11:21 AM    GFR est AA >60 03/31/2020 11:21 AM    GFR est non-AA >60 03/31/2020 11:21 AM    Calcium 9.0 03/31/2020 11:21 AM     Lab Results   Component Value Date/Time    Bilirubin, total 0.3 03/31/2020 11:21 AM    ALT (SGPT) 35 03/31/2020 11:21 AM    AST (SGOT) 11 (L) 03/31/2020 11:21 AM    Alk. phosphatase 53 03/31/2020 11:21 AM    Protein, total 6.8 03/31/2020 11:21 AM    Albumin 3.6 03/31/2020 11:21 AM    Globulin 3.2 03/31/2020 11:21 AM     10/17/19 breast MRI  DCIS at 12:00 8 cmfn      Records reviewed and summarized above. Pathology report(s) reviewed above. Radiology report(s) reviewed above. Assessment/plan:   1. 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 We explained why a patient can have microscopic cancer spread now even though physical examination, laboratory studies and imaging studies are negative for cancer. We explained that the same treatments used now as adjuvant or preventive treatments rarely if ever are curative in women who develop metastases. I discussed the potential risks of dose-dense chemotherapy with the patient. (DD AC-T, adriamycin 60 mg/m2; cyclophosphamide 600 mg/m2 q 2 weeks x 4; paclitaxel 80 mg/m2 qweekly x 12). Major toxicities include nausea and vomiting, stomatitis, fatigue, and a small risk of heart damage. Anemia frequently results and occasionally requires growth factors and rarely transfusions. Neutropenic fever is uncommon, but can be a life-threatening problem.  Also, there is a small but increased risk of myelodysplasia and acute leukemia. We provided the patient with detailed information concerning toxicity including frequent toxicities that only last a few days, such as nausea, vomiting, mouth sores, arthralgia, myalgia, and potentially allergic reactions to paclitaxel, as well as toxicities which can be longer lasting including total alopecia, fatigue, anemia and neuropathy. We provided the patient with detailed information concerning the toxicities of their regimen in addition to our verbal discussion. We discussed the toxicities of TC chemotherapy (docetaxel 75mg/m2/cyclophosphamide 600 mg/m2 q 3 weeks x 4)  in detail. This chemotherapy frequently causes a low white blood cell count and a small percent of patients require hospital admission for treatment of neutropenic fever. We explained that on occasion we consider the use of growth factors to minimize this risk. We explained to the patient that some side effects, if they occur, only last a few days including nausea, vomiting, stomatitis, arthralgia, myalgia, and allergic reactions to Taxotere. We told the patient that severe nausea and vomiting were very uncommon and that some side effects, if they occur, will last longer: this includes hair loss, which will be seen in all patients treated with these agents and fatigue, which will be seen in most. The patient was also told that if she is having menstrual function that she could develop chemotherapy-induced amenorrhea and infertility. We also told the patient that there was a very small risk of acute leukemia. We also informed the patient that heart damage is rare with these agents    Would use neulasta for TC. Using Predict!, there is a 2% OS difference between 2nd and 3rd gen chemo. OS benefit of chemo is 5-6%. For her, I recommend TC q 3 weeks x 4. Discussed cold caps and she is interested and was fitted. Also discussed oral and peripheral cryotherapy.       I spoke to Dr. Kortney Moyer and he is ok with proceeding with chemotherapy. Port placed by Dr. Micha Salinas on  2/17/20. Port can be removed, she will contact Dr. Micha Salinas to schedule this. She is agreeable to TC  q 3 weeks x 4. She has signed informed consent. Will proceed with cycle 4 today. The patient was given presciptions for a wig, emla cream, decadron to take 8 mg bid the day before and day after chemo, zofran and compazine. Neulasta the following day. Referral placed to Rad-Onc    2. Emotional well being:  She has excellent support and is coping well with her disease    3. tob use:  Has quit    4. Right breast wound:  No longer infected, healing well, but open wound, see above    5. Rexene Sinks age at diagnosis:  Genetic testing negative    6. Pain due to neulasta: Will have her take claritin every day, 2 advil wed night, thurs am, thurs night; 4 mg dex thurs am and maybe thurs pm if pain starts; improved with cycle 2    7. Nausea:  Controlled with zofran, compazine    8. Migraine:  Has fioricet prn; has imitrex    9. Itching:  otc benadryl ok    10. Yeast infection:  Fluconazole 200 mg on day 1 and 100 mg daily on days 2-7. I appreciate the opportunity to participate in Ms. Brittney paiz. Signed By: Orlando Pike MD      No orders of the defined types were placed in this encounter.

## 2020-04-21 NOTE — TELEPHONE ENCOUNTER
4/21/2020 9 AM: Called patient, who confirmed that she spoke to someone from [facility] and got everything straightened out to use her cold cap today.

## 2020-04-21 NOTE — PROGRESS NOTES
[facility] Progress Note    Date: 2020    Name: Jamir Johnson    MRN: [id number]         : 1975    Ms. Barrera Arrived ambulatory and in no distress for C4D1 of Taxotere/Cytoxan Regimen. Assessment was completed, no acute issues at this time, no new complaints voiced. Right chest wall port accessed x2 times , labs drawn & sent for processing. Chemotherapy Flowsheet 2020   Cycle C4D1   Date 2020   Drug / Regimen Taxotere/Cytoxan   Pre Hydration given   Pre Meds given   Notes COLD CAP/Neulasta OBI       1130 Patient proceed to appointment with Dr. Miguel Murcia. Ms. Jaden Barron vitals were reviewed. Patient Vitals for the past 12 hrs:   Temp Pulse Resp BP   20 1534 -- 73 18 111/63   20 1115 96.3 °F (35.7 °C) (!) 102 18 122/80       Lab results were obtained and reviewed. Recent Results (from the past 12 hour(s))   CBC WITH 3 PART DIFF    Collection Time: 20 11:25 AM   Result Value Ref Range    WBC 9.8 3.6 - 11.0 K/uL    RBC 3.90 3.80 - 5.20 M/uL    HGB 12.0 11.5 - 16.0 g/dL    HCT 36.1 35.0 - 47.0 %    MCV 92.6 80.0 - 99.0 FL    MCH 30.8 26.0 - 34.0 PG    MCHC 33.2 30.0 - 36.5 g/dL    RDW 16.1 (H) 11.8 - 15.8 %    PLATELET 475 152 - 137 K/uL    NEUTROPHILS 85 (H) 32 - 75 %    MIXED CELLS 4 3.2 - 16.9 %    LYMPHOCYTES 11 (L) 12 - 49 %    ABS. NEUTROPHILS 8.3 (H) 1.8 - 8.0 K/UL    ABS. MIXED CELLS 0.4 0.2 - 1.2 K/uL    ABS.  LYMPHOCYTES 1.1 0.8 - 3.5 K/UL    DF AUTOMATED     METABOLIC PANEL, COMPREHENSIVE    Collection Time: 20 11:25 AM   Result Value Ref Range    Sodium 141 136 - 145 mmol/L    Potassium 3.6 3.5 - 5.1 mmol/L    Chloride 109 (H) 97 - 108 mmol/L    CO2 24 21 - 32 mmol/L    Anion gap 8 5 - 15 mmol/L    Glucose 98 65 - 100 mg/dL    BUN 9 6 - 20 MG/DL    Creatinine 0.54 (L) 0.55 - 1.02 MG/DL    BUN/Creatinine ratio 17 12 - 20      GFR est AA >60 >60 ml/min/1.73m2    GFR est non-AA >60 >60 ml/min/1.73m2    Calcium 8.9 8.5 - 10.1 MG/DL    Bilirubin, total 0.3 0.2 - 1.0 MG/DL    ALT (SGPT) 34 12 - 78 U/L    AST (SGOT) 14 (L) 15 - 37 U/L    Alk. phosphatase 51 45 - 117 U/L    Protein, total 6.7 6.4 - 8.2 g/dL    Albumin 3.5 3.5 - 5.0 g/dL    Globulin 3.2 2.0 - 4.0 g/dL    A-G Ratio 1.1 1.1 - 2.2       Labs within treatment parameters. Prior to cooling the patient's hair was dampened with water and hair conditioner was applied to improve scalp contact and reduce the insulation effect of hair. Pre-cooling time: Cold cap therapy initiated 30 minutes prior to Taxotere/Cytoxan. Infusion time: 1325 to 1515    Post infusion cooling time: Cold cap therapy continued 90 minutes post Cytoxan infusion.     Medications:  Medications Administered     0.9% sodium chloride infusion     Admin Date  04/21/2020 Action  New Bag Dose  25 mL/hr Rate  25 mL/hr Route  IntraVENous Administered By  Ye De La O RN          0.9% sodium chloride injection 10 mL     Admin Date  04/21/2020 Action  Given Dose  10 mL Route  IntraVENous Administered By  Ye De La O RN           Admin Date  04/21/2020 Action  Given Dose  10 mL Route  IntraVENous Administered By  Ye De La O RN           Admin Date  04/21/2020 Action  Given Dose  10 mL Route  IntraVENous Administered By  Ye De La O RN          cyclophosphamide (CYTOXAN) 1,254 mg in 0.9% sodium chloride 250 mL, overfill volume 25 mL chemo infusion     Admin Date  04/21/2020 Action  New Bag Dose  1254 mg Rate  675.4 mL/hr Route  IntraVENous Administered By  Ye De La O RN          dexamethasone (PF) (DECADRON) 10 mg/mL injection 10 mg     Admin Date  04/21/2020 Action  Given Dose  10 mg Route  IntraVENous Administered By  Ye De La O RN          DOCEtaxeL (TAXOTERE) 157 mg in 0.9% sodium chloride 250 mL, overfill volume 25 mL chemo infusion     Admin Date  04/21/2020 Action  New Bag Dose  157 mg Route  IntraVENous Administered By  Ye De La O RN          heparin (porcine) pf 300-500 Units     Admin Date  04/21/2020 Action  Given Dose  500 Units Route  InterCATHeter Administered By  Tara Mcallister RN          palonosetron HCl (ALOXI) injection 0.25 mg     Admin Date  04/21/2020 Action  Given Dose  0.25 mg Route  IntraVENous Administered By  Tara Mcallister RN          pegfilgrastim (NEULASTA) wearable SQ injector 6 mg     Admin Date  04/21/2020 Action  Given Dose  6 mg Route  SubCUTAneous Administered By  Tara Mcallister RN          saline peripheral flush soln 10 mL     Admin Date  04/21/2020 Action  Given Dose  10 mL Route  InterCATHeter Administered By  Tara Mcallister RN           Admin Date  04/21/2020 Action  Given Dose  10 mL Route  InterCATHeter Administered By  Tara Mcallister RN          sodium chloride 0.9 % bolus infusion 500 mL     Admin Date  04/21/2020 Action  New Bag Dose  500 mL Route  IntraVENous Administered By  Tara Mcallister RN                  Education provided to patient about Neulasta On Body Injector including: side effects, how/when to remove the device, as well as what to do in the event of device malfunction. Opportunity for questions was provided and all questions were answered. Patient verbalized understanding. Ms. Tabby Solano tolerated treatment well and was discharged from [place] [address] in stable condition at 1700. Port de-accessed, flushed & heparinized per protocol. She has completed her treatment regimen and has no further appointments scheduled in the [facility] at this time.     Gerald Fuchs RN  April 21, 2020

## 2020-04-21 NOTE — PROGRESS NOTES
Connie Ceja is a 38 y.o. female Follow up for the Evaluation for Breast Cancer. 1. Have you been to the ER, urgent care clinic since your last visit? Hospitalized since your last visit? No    2. Have you seen or consulted any other health care providers outside of the [address] [place] [address] since your last visit? Include any pap smears or colon screening.  No

## 2020-04-23 ENCOUNTER — TELEPHONE (OUTPATIENT)
Dept: ONCOLOGY | Age: 45
End: 2020-04-23

## 2020-04-23 NOTE — TELEPHONE ENCOUNTER
She wants to talk with nurse about when patients  last treatment was and when she should start radiation

## 2020-04-23 NOTE — TELEPHONE ENCOUNTER
4/23/2020 4:56 PM: Returned call to [facility]. No answer; left voicemail advising that I spoke to the patient today and she will be going to a radiation oncologist in [place] but the patient did not know the name of the practice. Also advised that the patient is going to obtain this information and call back so this office can send records. Encouraged Silvia to call back with questions or concerns.

## 2020-04-24 ENCOUNTER — TELEPHONE (OUTPATIENT)
Dept: ONCOLOGY | Age: 45
End: 2020-04-24

## 2020-04-24 NOTE — TELEPHONE ENCOUNTER
Mallika Licona from [address] returned [employer] phone call from yesterday stating that Patient is going to be seeing Radiation Oncologist Skylar Le at [facility] in [place]. The phone number there is [phone] and she did not have the Fax number and stated that since the Patient had been referred there previously back in February but then it got cancelled due to she needed to do Chemotherapy first, all the Facility needed from us was the most recent information. I called and spoke with Dyanna Gitelman and she gave me the Fax number [phone] and I inquired about how to make an Appointment for the Patient and she stated that if we send them everything we have on Patient that they will then just contact the Patient themselves and Schedule the Appointment. Will send over her information today and will save the confirmation.

## 2020-09-24 ENCOUNTER — TELEPHONE (OUTPATIENT)
Dept: ONCOLOGY | Age: 45
End: 2020-09-24

## 2020-09-28 ENCOUNTER — OFFICE VISIT (OUTPATIENT)
Dept: ONCOLOGY | Age: 45
End: 2020-09-28
Payer: COMMERCIAL

## 2020-09-28 VITALS
DIASTOLIC BLOOD PRESSURE: 65 MMHG | SYSTOLIC BLOOD PRESSURE: 100 MMHG | HEART RATE: 84 BPM | BODY MASS INDEX: 41.57 KG/M2 | HEIGHT: 61 IN | OXYGEN SATURATION: 96 % | TEMPERATURE: 97.9 F | WEIGHT: 220.2 LBS

## 2020-09-28 DIAGNOSIS — C50.112 MALIGNANT NEOPLASM OF CENTRAL PORTION OF LEFT BREAST IN FEMALE, ESTROGEN RECEPTOR NEGATIVE (HCC): Primary | ICD-10-CM

## 2020-09-28 DIAGNOSIS — Z17.1 MALIGNANT NEOPLASM OF CENTRAL PORTION OF LEFT BREAST IN FEMALE, ESTROGEN RECEPTOR NEGATIVE (HCC): Primary | ICD-10-CM

## 2020-09-28 PROCEDURE — 99213 OFFICE O/P EST LOW 20 MIN: CPT | Performed by: INTERNAL MEDICINE

## 2020-09-28 NOTE — PROGRESS NOTES
Gus Smith is a 54 y.o. female  Chief Complaint   Patient presents with    Follow-up    Breast Cancer   1. Have you been to the ER, urgent care clinic since your last visit? Hospitalized since your last visit? No    2. Have you seen or consulted any other health care providers outside of the [address] [place] [address] since your last visit? Include any pap smears or colon screening.  No

## 2020-09-28 NOTE — PROGRESS NOTES
[facility] [place] at [place] [address] [address] [facility] [address] [facility]: [phone]  F: [phone]      Reason for Visit:   Scott Mahan is a 54 y.o. female who is seen in consultation at the request of Dr. Darius Hankins for evaluation of therapy for breast cancer (he called and asked if I could see her)    Surgeon:  Dr. Nasim Abdi surgeon:  Dr. Fuller Heading    Treatment History:   · 10/2/19 left breast core bx:  DCIS gr 2-3, ER negative, IN negative  · 19 left breast lumpectomy:  IDC, gr 3, 8 mm, ER negative, IN negative, HER 2 negative at IHC 0, Ki-67 60%, 2 mm DCIS, gr 3, 0/3 LN, pT1b pN0 cM0  · [facility] genetic testing:  Negative  · TC   · S/p XRT 20    History of Present Illness: An abnormal mammogram led to the pathology above. Interval history:  In today for follow up. Complains of gr 1 fatigue, gr 1 insomnia, gr 1 loss of cognition, 1/10 right breast pain    FH:  Paternal Great aunts (two of them), 53-62 and 64-70 years old; maternal great aunt in her 76s; no ovarian, advanced prostate cancer, or pancreas cancer    LMP 3/10/2020    Past Medical History:   Diagnosis Date    Cancer ([address]     Headache     Liver tumor (benign)       Past Surgical History:   Procedure Laterality Date    HX  SECTION      x 2    IN HISTORY NEW OR CHANGING MOLES      pre-cancerous       Social History     Tobacco Use    Smoking status: Former Smoker     Last attempt to quit: 5/10/2008     Years since quittin.3    Smokeless tobacco: Never Used   Substance Use Topics    Alcohol use: Yes     Comment: rarely      Family History   Problem Relation Age of Onset    Thyroid Disease Mother     Thyroid Disease Sister      Current Outpatient Medications   Medication Sig    fluconazole (DIFLUCAN) 100 mg tablet Please take 200 mg on day 1 and 100 mg on days 2-7. FDA advises cautious prescribing of oral fluconazole in pregnancy.     butalbital-acetaminophen-caffeine (FIORICET, ESGIC) -40 mg per tablet Take 1 Tab by mouth every four (4) hours as needed for Headache.  ondansetron hcl (ZOFRAN) 8 mg tablet Take 1 Tab by mouth every eight (8) hours as needed for Nausea.  prochlorperazine (COMPAZINE) 10 mg tablet Take 1 Tab by mouth every six (6) hours as needed for Nausea.  lidocaine-prilocaine (EMLA) topical cream Apply  to affected area as needed for Pain.  dexAMETHasone (DECADRON) 4 mg tablet 8 mg PO bid the day before and day after chemo    escitalopram oxalate (LEXAPRO) 5 mg tablet Take  by mouth daily.  SUMAtriptan (IMITREX) 50 mg tablet Take 50 mg by mouth once as needed for Migraine. No current facility-administered medications for this visit. Allergies   Allergen Reactions    Sulfa (Sulfonamide Antibiotics) Hives        Review of Systems: A complete review of systems was obtained, negative except as described above. Physical Exam:     Visit Vitals  /65   Pulse 84   Temp 97.9 °F (36.6 °C)   Ht 5' 1\" (1.549 m)   Wt 220 lb 3.2 oz (99.9 kg)   SpO2 96%   BMI 41.61 kg/m²     ECOG PS: 0  General: No distress  Eyes: Anicteric sclerae  HENT: Atraumatic  Neck: Supple  Respiratory: Normal respiratory effort  CV: No peripheral edema  GI: nondistended  Skin: No rashes, ecchymoses, or petechiae  Psych: Alert, oriented, appropriate affect, normal judgment/insight        Results:     Lab Results   Component Value Date/Time    WBC 9.8 04/21/2020 11:25 AM    HGB 12.0 04/21/2020 11:25 AM    HCT 36.1 04/21/2020 11:25 AM    PLATELET 508 12/04/9248 11:25 AM    MCV 92.6 04/21/2020 11:25 AM    ABS.  NEUTROPHILS 8.3 (H) 04/21/2020 11:25 AM     Lab Results   Component Value Date/Time    Sodium 141 04/21/2020 11:25 AM    Potassium 3.6 04/21/2020 11:25 AM    Chloride 109 (H) 04/21/2020 11:25 AM    CO2 24 04/21/2020 11:25 AM    Glucose 98 04/21/2020 11:25 AM    BUN 9 04/21/2020 11:25 AM    Creatinine 0.54 (L) 04/21/2020 11:25 AM GFR est AA >60 04/21/2020 11:25 AM    GFR est non-AA >60 04/21/2020 11:25 AM    Calcium 8.9 04/21/2020 11:25 AM     Lab Results   Component Value Date/Time    Bilirubin, total 0.3 04/21/2020 11:25 AM    ALT (SGPT) 34 04/21/2020 11:25 AM    Alk. phosphatase 51 04/21/2020 11:25 AM    Protein, total 6.7 04/21/2020 11:25 AM    Albumin 3.5 04/21/2020 11:25 AM    Globulin 3.2 04/21/2020 11:25 AM     10/17/19 breast MRI  DCIS at 12:00 8 cmfn      Records reviewed and summarized above. Pathology report(s) reviewed above. Radiology report(s) reviewed above. Assessment/plan:   1. 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 We explained why a patient can have microscopic cancer spread now even though physical examination, laboratory studies and imaging studies are negative for cancer. We explained that the same treatments used now as adjuvant or preventive treatments rarely if ever are curative in women who develop metastases. SKYLA    Mammogram last week and MRI in Nov with Gloria    2. Emotional well being:  She has excellent support and is coping well with her disease    3. tob use:  Has quit    4. Right breast wound:  Resolved    5. David Bailey age at diagnosis:  Genetic testing negative      I appreciate the opportunity to participate in MsLea Carly Garcia izabel. Signed By: Trudy Enrique MD      No orders of the defined types were placed in this encounter.

## 2021-02-19 ENCOUNTER — TRANSCRIBE ORDER (OUTPATIENT)
Dept: SCHEDULING | Age: 46
End: 2021-02-19

## 2021-02-19 DIAGNOSIS — C50.812 MALIGNANT NEOPLASM OF OVERLAPPING SITES OF LEFT FEMALE BREAST (HCC): Primary | ICD-10-CM

## 2021-03-01 ENCOUNTER — HOSPITAL ENCOUNTER (OUTPATIENT)
Dept: CT IMAGING | Age: 46
Discharge: HOME | End: 2021-03-01
Attending: RADIOLOGY
Payer: COMMERCIAL

## 2021-03-01 DIAGNOSIS — C50.812 MALIGNANT NEOPLASM OF OVERLAPPING SITES OF LEFT FEMALE BREAST (HCC): ICD-10-CM

## 2021-03-01 PROCEDURE — 74011000636 HC RX REV CODE- 636: Performed by: RADIOLOGY

## 2021-03-01 PROCEDURE — 71260 CT THORAX DX C+: CPT

## 2021-03-01 RX ADMIN — IOPAMIDOL 80 ML: 755 INJECTION, SOLUTION INTRAVENOUS at 13:52

## 2021-03-02 ENCOUNTER — TELEPHONE (OUTPATIENT)
Dept: ONCOLOGY | Age: 46
End: 2021-03-02

## 2021-03-02 NOTE — TELEPHONE ENCOUNTER
3/2/21 5:07 PM: Called patient and advised that Dr. Toma Dale is just now seeing her CT results and will need to discuss them with the radiologist who read the CT. Also advised that per Dr. Toma Dale, the lesions may be too small to biopsy but he will discuss further with the radiologist. Patient verbalized understanding.

## 2021-03-03 DIAGNOSIS — C50.112 MALIGNANT NEOPLASM OF CENTRAL PORTION OF LEFT BREAST IN FEMALE, ESTROGEN RECEPTOR NEGATIVE (HCC): Primary | ICD-10-CM

## 2021-03-03 DIAGNOSIS — Z17.1 MALIGNANT NEOPLASM OF CENTRAL PORTION OF LEFT BREAST IN FEMALE, ESTROGEN RECEPTOR NEGATIVE (HCC): Primary | ICD-10-CM

## 2021-03-03 RX ORDER — LEVOFLOXACIN 750 MG/1
750 TABLET ORAL DAILY
Qty: 10 TAB | Refills: 0 | Status: SHIPPED | OUTPATIENT
Start: 2021-03-03 | End: 2021-03-13

## 2021-03-03 NOTE — PROGRESS NOTES
Discussed with radiology, too small to biopsy. May be inflammatory. Will rx levaquin 750 mg daily x 10 days and repeat CT chest in 2 months.   Discussed with pt

## 2021-03-05 ENCOUNTER — TELEPHONE (OUTPATIENT)
Dept: ONCOLOGY | Age: 46
End: 2021-03-05

## 2021-03-05 NOTE — TELEPHONE ENCOUNTER
3/5/21 4:28 PM: Called patient and inquired if she would like to keep her office appointment on 3/9 since she and Dr. Christos Mccann already discussed her CT results. Patient stated she does not need to keep that appointment and that it can be canceled. Patient did not have any questions or concerns at this time.

## 2021-04-01 ENCOUNTER — TRANSCRIBE ORDER (OUTPATIENT)
Dept: SCHEDULING | Age: 46
End: 2021-04-01

## 2021-04-01 DIAGNOSIS — R14.0 ABDOMINAL BLOATING: ICD-10-CM

## 2021-04-01 DIAGNOSIS — R10.84 GENERALIZED ABDOMINAL PAIN: Primary | ICD-10-CM

## 2021-04-01 DIAGNOSIS — Z85.3 PERSONAL HISTORY OF BREAST CANCER: ICD-10-CM

## 2021-05-05 ENCOUNTER — APPOINTMENT (OUTPATIENT)
Dept: CT IMAGING | Age: 46
End: 2021-05-05
Attending: INTERNAL MEDICINE
Payer: COMMERCIAL

## 2021-05-05 ENCOUNTER — HOSPITAL ENCOUNTER (OUTPATIENT)
Dept: CT IMAGING | Age: 46
Discharge: HOME | End: 2021-05-05
Attending: FAMILY MEDICINE
Payer: COMMERCIAL

## 2021-05-05 DIAGNOSIS — R14.0 ABDOMINAL BLOATING: ICD-10-CM

## 2021-05-05 DIAGNOSIS — C50.112 MALIGNANT NEOPLASM OF CENTRAL PORTION OF LEFT BREAST IN FEMALE, ESTROGEN RECEPTOR NEGATIVE (HCC): ICD-10-CM

## 2021-05-05 DIAGNOSIS — Z17.1 MALIGNANT NEOPLASM OF CENTRAL PORTION OF LEFT BREAST IN FEMALE, ESTROGEN RECEPTOR NEGATIVE (HCC): ICD-10-CM

## 2021-05-05 DIAGNOSIS — R10.84 GENERALIZED ABDOMINAL PAIN: ICD-10-CM

## 2021-05-05 DIAGNOSIS — Z85.3 PERSONAL HISTORY OF BREAST CANCER: ICD-10-CM

## 2021-05-05 PROCEDURE — 74177 CT ABD & PELVIS W/CONTRAST: CPT

## 2021-05-05 PROCEDURE — 74011000636 HC RX REV CODE- 636: Performed by: RADIOLOGY

## 2021-05-05 PROCEDURE — 71260 CT THORAX DX C+: CPT

## 2021-05-05 RX ADMIN — IOPAMIDOL 100 ML: 755 INJECTION, SOLUTION INTRAVENOUS at 10:02

## 2021-05-05 RX ADMIN — IOHEXOL 50 ML: 240 INJECTION, SOLUTION INTRATHECAL; INTRAVASCULAR; INTRAVENOUS; ORAL at 10:02

## 2021-10-26 ENCOUNTER — OFFICE VISIT (OUTPATIENT)
Dept: ONCOLOGY | Age: 46
End: 2021-10-26
Payer: COMMERCIAL

## 2021-10-26 VITALS
SYSTOLIC BLOOD PRESSURE: 107 MMHG | OXYGEN SATURATION: 93 % | TEMPERATURE: 98 F | HEIGHT: 62 IN | HEART RATE: 79 BPM | BODY MASS INDEX: 41.59 KG/M2 | WEIGHT: 226 LBS | DIASTOLIC BLOOD PRESSURE: 74 MMHG | RESPIRATION RATE: 18 BRPM

## 2021-10-26 DIAGNOSIS — Z17.1 MALIGNANT NEOPLASM OF LEFT BREAST IN FEMALE, ESTROGEN RECEPTOR NEGATIVE, UNSPECIFIED SITE OF BREAST (HCC): ICD-10-CM

## 2021-10-26 DIAGNOSIS — R91.8 PULMONARY NODULES: Primary | ICD-10-CM

## 2021-10-26 DIAGNOSIS — C50.912 MALIGNANT NEOPLASM OF LEFT BREAST IN FEMALE, ESTROGEN RECEPTOR NEGATIVE, UNSPECIFIED SITE OF BREAST (HCC): ICD-10-CM

## 2021-10-26 PROCEDURE — 99212 OFFICE O/P EST SF 10 MIN: CPT | Performed by: INTERNAL MEDICINE

## 2021-10-26 RX ORDER — LORAZEPAM 1 MG/1
1 TABLET ORAL
COMMUNITY
Start: 2021-07-20

## 2021-10-26 NOTE — PROGRESS NOTES
Cancer [place] at [facility]  [address] [facility], [address]  W: [phone]  F: [phone]      Reason for Visit:   Gabino Lees is a 51 y.o. female who is seen in follow up for breast cancer    Surgeon:  Dr. Kristine Juarez surgeon:  Dr. Ankita Elise  Breast Surgeon: Dr. Kim Chávez    Treatment History:   · 10/2/19 left breast core bx:  DCIS gr 2-3, ER negative, [place] negative  · 19 left breast lumpectomy:  IDC, gr 3, 8 mm, ER negative, WA negative, HER 2 negative at IHC 0, Ki-67 60%, 2 mm DCIS, gr 3, 0/3 LN, pT1b pN0 cM0  · [employer] genetic testing:  Negative  · TC   · S/p XRT 20    History of Present Illness: An abnormal mammogram led to the pathology above. Interval history:  In today for follow up. Reports gr 1 fatigue, gr 1 insomnia, gr 1 loss of cognition, gr 1 cough, gr 1 skin rash, gr 1 headache, gr 1 loss of libido. FH:  Paternal Great aunts (two of them), 53-62 and 64-70 years old; maternal great aunt in her 76s; no ovarian, advanced prostate cancer, or pancreas cancer    LMP 2021    Past Medical History:   Diagnosis Date    Cancer ([facility] [address]     Headache     Liver tumor (benign)       Past Surgical History:   Procedure Laterality Date    HX  SECTION      x 2    [place] HISTORY NEW OR CHANGING MOLES      pre-cancerous       Social History     Tobacco Use    Smoking status: Former Smoker     Quit date: 5/10/2008     Years since quittin.4    Smokeless tobacco: Never Used   Substance Use Topics    Alcohol use: Yes     Comment: rarely      Family History   Problem Relation Age of Onset    Thyroid Disease Mother     Thyroid Disease Sister      Current Outpatient Medications   Medication Sig    LORazepam (ATIVAN) 1 mg tablet Take 1 mg by mouth two (2) times daily as needed.  butalbital-acetaminophen-caffeine (FIORICET, ESGIC) -40 mg per tablet Take 1 Tab by mouth every four (4) hours as needed for Headache.  escitalopram oxalate (LEXAPRO) 5 mg tablet Take  by mouth daily.  SUMAtriptan (IMITREX) 50 mg tablet Take 50 mg by mouth once as needed for Migraine.  ondansetron hcl (ZOFRAN) 8 mg tablet Take 1 Tab by mouth every eight (8) hours as needed for Nausea. (Patient not taking: Reported on 10/26/2021)    prochlorperazine (COMPAZINE) 10 mg tablet Take 1 Tab by mouth every six (6) hours as needed for Nausea. (Patient not taking: Reported on 10/26/2021)    lidocaine-prilocaine (EMLA) topical cream Apply  to affected area as needed for Pain. (Patient not taking: Reported on 10/26/2021)    dexAMETHasone (DECADRON) 4 mg tablet 8 mg PO bid the day before and day after chemo (Patient not taking: Reported on 10/26/2021)     No current facility-administered medications for this visit. Allergies   Allergen Reactions    Sulfa (Sulfonamide Antibiotics) Hives        Review of Systems: A complete review of systems was obtained, negative except as described above. Physical Exam:     Visit Vitals  /74   Pulse 79   Temp 98 °F (36.7 °C) (Temporal)   Resp 18   Ht 5' 2\" (1.575 m)   Wt 226 lb (102.5 kg)   SpO2 93%   BMI 41.34 kg/m²     ECOG PS: 0  General: No distress  Eyes: Anicteric sclerae  HENT: Atraumatic  Neck: Supple  Respiratory: Normal respiratory effort  CV: No peripheral edema  GI: nondistended  Skin: No rashes, ecchymoses, or petechiae  Psych: Alert, oriented, appropriate affect, normal judgment/insight        Results:     Lab Results   Component Value Date/Time    WBC 9.8 04/21/2020 11:25 AM    HGB 12.0 04/21/2020 11:25 AM    HCT 36.1 04/21/2020 11:25 AM    PLATELET 854 07/48/2182 11:25 AM    MCV 92.6 04/21/2020 11:25 AM    ABS.  NEUTROPHILS 8.3 (H) 04/21/2020 11:25 AM     Lab Results   Component Value Date/Time    Sodium 141 04/21/2020 11:25 AM    Potassium 3.6 04/21/2020 11:25 AM    Chloride 109 (H) 04/21/2020 11:25 AM    CO2 24 04/21/2020 11:25 AM    Glucose 98 04/21/2020 11:25 AM    BUN 9 04/21/2020 11:25 AM    Creatinine 0.54 (L) 04/21/2020 11:25 AM    GFR est AA >60 04/21/2020 11:25 AM    GFR est non-AA >60 04/21/2020 11:25 AM    Calcium 8.9 04/21/2020 11:25 AM     Lab Results   Component Value Date/Time    Bilirubin, total 0.3 04/21/2020 11:25 AM    ALT (SGPT) 34 04/21/2020 11:25 AM    Alk. phosphatase 51 04/21/2020 11:25 AM    Protein, total 6.7 04/21/2020 11:25 AM    Albumin 3.5 04/21/2020 11:25 AM    Globulin 3.2 04/21/2020 11:25 AM     10/17/19 breast MRI  DCIS at 12:00 8 cmfn      Records reviewed and summarized above. Pathology report(s) reviewed above. Radiology report(s) reviewed above. Assessment/plan:   1. 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 We explained why a patient can have microscopic cancer spread now even though physical examination, laboratory studies and imaging studies are negative for cancer. We explained that the same treatments used now as adjuvant or preventive treatments rarely if ever are curative in women who develop metastases. SKYLA    Mammogram from 9/27/21 showed suspicious area to left breast calcification which was biopsied 10/14/21 and right breast cyst which was aspirated 10/14/21. Per patient results benign. Will get records    2. Emotional well being:  She has excellent support and is coping well with her disease. She is anxious about recurrence. Will provide support group information. 3. tob use:  Has quit    4. Right breast wound:  Resolved    5. [address] [facility] age at diagnosis:  Genetic testing negative    6. Lung Nodules: seen on CT 3/2021, treated with antibiotics and significant improvement on CT 5/2021. Will repeat now, ordered. This patient was seen in conjunction with Boaz Pantoja NP.  I personally reviewed the history and all points in the assessment and plan, and performed key points on the exam.   Left breast IDC, TN, SKYLA. Will get CT chest wo contrast to eval lung nodules that were likely inflammatory/infectious. RTC 1 year    I appreciate the opportunity to participate in Ms. Sylvia Aparicio izabel. Signed By: Francisco Pereira MD      No orders of the defined types were placed in this encounter.

## 2021-10-26 NOTE — PROGRESS NOTES
Agapito Harding is a 51 y.o. female Follow up for the evaluation of breast cancer. 1. Have you been to the ER, urgent care clinic since your last visit? Hospitalized since your last visit? No    2. Have you seen or consulted any other health care providers outside of the [address] [place] [address] since your last visit? Include any pap smears or colon screening.  No

## 2021-11-22 ENCOUNTER — HOSPITAL ENCOUNTER (OUTPATIENT)
Dept: CT IMAGING | Age: 46
Discharge: HOME | End: 2021-11-22
Attending: NURSE PRACTITIONER
Payer: COMMERCIAL

## 2021-11-22 DIAGNOSIS — C50.912 MALIGNANT NEOPLASM OF LEFT BREAST IN FEMALE, ESTROGEN RECEPTOR NEGATIVE, UNSPECIFIED SITE OF BREAST (HCC): ICD-10-CM

## 2021-11-22 DIAGNOSIS — R91.8 PULMONARY NODULES: ICD-10-CM

## 2021-11-22 DIAGNOSIS — Z17.1 MALIGNANT NEOPLASM OF LEFT BREAST IN FEMALE, ESTROGEN RECEPTOR NEGATIVE, UNSPECIFIED SITE OF BREAST (HCC): ICD-10-CM

## 2021-11-22 PROCEDURE — 71250 CT THORAX DX C-: CPT

## 2021-11-23 NOTE — PROGRESS NOTES
Cora Ms. Barrera,     Your Chest CT shows resolution of those pulmonary nodules we were watching which is great! Let me know if you have any further questions or concerns.      Pina Rabago, NP

## 2022-03-18 PROBLEM — Z17.1 MALIGNANT NEOPLASM OF LEFT BREAST IN FEMALE, ESTROGEN RECEPTOR NEGATIVE (HCC): Status: ACTIVE | Noted: 2020-02-06

## 2022-03-18 PROBLEM — C50.912 MALIGNANT NEOPLASM OF LEFT BREAST IN FEMALE, ESTROGEN RECEPTOR NEGATIVE (HCC): Status: ACTIVE | Noted: 2020-02-06

## 2022-03-20 PROBLEM — E66.01 OBESITY, MORBID (HCC): Status: ACTIVE | Noted: 2020-02-05

## 2022-11-07 ENCOUNTER — OFFICE VISIT (OUTPATIENT)
Dept: ONCOLOGY | Age: 47
End: 2022-11-07
Payer: COMMERCIAL

## 2022-11-07 VITALS
TEMPERATURE: 98 F | HEART RATE: 87 BPM | DIASTOLIC BLOOD PRESSURE: 72 MMHG | BODY MASS INDEX: 41.34 KG/M2 | OXYGEN SATURATION: 96 % | RESPIRATION RATE: 18 BRPM | HEIGHT: 62 IN | SYSTOLIC BLOOD PRESSURE: 126 MMHG

## 2022-11-07 DIAGNOSIS — Z17.1 MALIGNANT NEOPLASM OF LEFT BREAST IN FEMALE, ESTROGEN RECEPTOR NEGATIVE, UNSPECIFIED SITE OF BREAST (HCC): Primary | ICD-10-CM

## 2022-11-07 DIAGNOSIS — C50.912 MALIGNANT NEOPLASM OF LEFT BREAST IN FEMALE, ESTROGEN RECEPTOR NEGATIVE, UNSPECIFIED SITE OF BREAST (HCC): Primary | ICD-10-CM

## 2022-11-07 PROCEDURE — 99212 OFFICE O/P EST SF 10 MIN: CPT | Performed by: INTERNAL MEDICINE

## 2022-11-07 NOTE — PROGRESS NOTES
Judge Carterens is a 49 y.o. female Follow up for the evaluation of breast cancer. 1. Have you been to the ER, urgent care clinic since your last visit? Hospitalized since your last visit? No    2. Have you seen or consulted any other health care providers outside of the [address] [place] [address] since your last visit? Include any pap smears or colon screening.  No

## 2022-11-07 NOTE — PROGRESS NOTES
Cancer [place] at [address], [address] [facility] [address] [facility]  W: [phone]  F: [phone]      Reason for Visit:   Garima Montelongo is a 49 y.o. female who is seen in follow up for breast cancer    Surgeon:  Dr. Ran Sheriff surgeon:  Dr. Иван Sotomayor  Breast Surgeon: Dr. Tanya Alvarado    Treatment History:   10/2/19 left breast core bx:  DCIS gr 2-3, ER negative, SC negative  19 left breast lumpectomy:  IDC, gr 3, 8 mm, ER negative, SC negative, HER 2 negative at IHC 0, Ki-67 60%, 2 mm DCIS, gr 3, 0/3 LN, pT1b pN0 cM0  [employer] genetic testing:  Negative  TC   S/p XRT 20    History of Present Illness: An abnormal mammogram led to the pathology above. Interval history:  In today for follow up. FH:  Paternal Great aunts (two of them), 53-62 and 64-70 years old; maternal great aunt in her 76s; no ovarian, advanced prostate cancer, or pancreas cancer    LMP 2021    Past Medical History:   Diagnosis Date    Cancer ([facility] [address]     Headache     Liver tumor (benign)       Past Surgical History:   Procedure Laterality Date    HX  SECTION      x 2    SC HISTORY NEW OR CHANGING MOLES      pre-cancerous       Social History     Tobacco Use    Smoking status: Former     Types: Cigarettes     Quit date: 5/10/2008     Years since quittin.5    Smokeless tobacco: Never   Substance Use Topics    Alcohol use: Yes     Comment: rarely      Family History   Problem Relation Age of Onset    Thyroid Disease Mother     Thyroid Disease Sister      Current Outpatient Medications   Medication Sig    LORazepam (ATIVAN) 1 mg tablet Take 1 mg by mouth two (2) times daily as needed. butalbital-acetaminophen-caffeine (FIORICET, ESGIC) -40 mg per tablet Take 1 Tab by mouth every four (4) hours as needed for Headache.    escitalopram oxalate (LEXAPRO) 5 mg tablet Take  by mouth daily.     SUMAtriptan (IMITREX) 50 mg tablet Take 50 mg by mouth once as needed for Migraine. No current facility-administered medications for this visit. Allergies   Allergen Reactions    Sulfa (Sulfonamide Antibiotics) Hives        Review of Systems: A complete review of systems was obtained, negative except as described above. Physical Exam:     Visit Vitals  /72   Pulse 87   Temp 98 °F (36.7 °C) (Temporal)   Resp 18   Ht 5' 2\" (1.575 m)   SpO2 96%   BMI 41.34 kg/m²     ECOG PS: 0  General: No distress  Eyes: Anicteric sclerae  HENT: Atraumatic  Neck: Supple  Respiratory: Normal respiratory effort  CV: No peripheral edema  GI: nondistended  Skin: No rashes, ecchymoses, or petechiae  Psych: Alert, oriented, appropriate affect, normal judgment/insight        Results:     Lab Results   Component Value Date/Time    WBC 9.8 04/21/2020 11:25 AM    HGB 12.0 04/21/2020 11:25 AM    HCT 36.1 04/21/2020 11:25 AM    PLATELET 977 33/32/0969 11:25 AM    MCV 92.6 04/21/2020 11:25 AM    ABS. NEUTROPHILS 8.3 (H) 04/21/2020 11:25 AM     Lab Results   Component Value Date/Time    Sodium 141 04/21/2020 11:25 AM    Potassium 3.6 04/21/2020 11:25 AM    Chloride 109 (H) 04/21/2020 11:25 AM    CO2 24 04/21/2020 11:25 AM    Glucose 98 04/21/2020 11:25 AM    BUN 9 04/21/2020 11:25 AM    Creatinine 0.54 (L) 04/21/2020 11:25 AM    GFR est AA >60 04/21/2020 11:25 AM    GFR est non-AA >60 04/21/2020 11:25 AM    Calcium 8.9 04/21/2020 11:25 AM     Lab Results   Component Value Date/Time    Bilirubin, total 0.3 04/21/2020 11:25 AM    ALT (SGPT) 34 04/21/2020 11:25 AM    Alk. phosphatase 51 04/21/2020 11:25 AM    Protein, total 6.7 04/21/2020 11:25 AM    Albumin 3.5 04/21/2020 11:25 AM    Globulin 3.2 04/21/2020 11:25 AM     10/17/19 breast MRI  DCIS at 12:00 8 cmfn    11/22/21 CT chest  IMPRESSION  1. Postsurgical changes in both breasts. There is a locule of gas within the  lateral, left breast of uncertain significance. Recommend clinical correlation  for recent procedure.    2. Resolution of the previously identified lung nodules. 3. Incidental findings as above. Records reviewed and summarized above. Pathology report(s) reviewed above. Radiology report(s) reviewed above. Assessment/plan:   1. 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 We explained why a patient can have microscopic cancer spread now even though physical examination, laboratory studies and imaging studies are negative for cancer. We explained that the same treatments used now as adjuvant or preventive treatments rarely if ever are curative in women who develop metastases. SKYLA    Mammogram from 10/10/22 needed further imagine, 10/31/22 right additional images were benign. MRI next week    2. Emotional well being:  She has excellent support and is coping well with her disease. She is anxious about recurrence. Will provide support group information. 3. tob use:  Has quit    4. [place] age at diagnosis:  Genetic testing negative    6. Lung Nodules: seen on CT 3/2021, treated with antibiotics and significant improvement on CT 5/2021. Resolved  11/21/21    I appreciate the opportunity to participate in Ms. Micheal Mahoney care. Signed By: Taya Parnell MD      No orders of the defined types were placed in this encounter.

## 2023-11-13 ENCOUNTER — TELEPHONE (OUTPATIENT)
Age: 48
End: 2023-11-13

## 2023-11-13 NOTE — TELEPHONE ENCOUNTER
LVM returning the patient's call to reschedule her 11/14/23 appointment. Quality 431: Preventive Care And Screening: Unhealthy Alcohol Use - Screening: Patient not identified as an unhealthy alcohol user when screened for unhealthy alcohol use using a systematic screening method Quality 226: Preventive Care And Screening: Tobacco Use: Screening And Cessation Intervention: Patient screened for tobacco use and is an ex/non-smoker Detail Level: Detailed Quality 130: Documentation Of Current Medications In The Medical Record: Current Medications Documented

## 2024-01-08 ENCOUNTER — TELEMEDICINE (OUTPATIENT)
Age: 49
End: 2024-01-08
Payer: COMMERCIAL

## 2024-01-08 DIAGNOSIS — Z85.3 HISTORY OF BREAST CANCER: Primary | ICD-10-CM

## 2024-01-08 PROCEDURE — 99212 OFFICE O/P EST SF 10 MIN: CPT | Performed by: INTERNAL MEDICINE

## 2024-01-08 NOTE — PROGRESS NOTES
Cancer [place] at [facility]   [address] [facility], [address] [facility] [address]   W: [phone]  F: [phone]             Reason for Visit:     Elaine Jacinto is a 48 y.o. female who is seen in follow up for breast cancer      Surgeon:  Dr. Marsh   Plastic surgeon:  Dr. Callahan   Breast Surgeon: Dr. Quinones          Treatment History:      10/2/19 left breast core bx:  DCIS gr 2-3, ER negative, [place] negative    12/18/19 left breast lumpectomy:  IDC, gr 3, 8 mm, ER negative, [place] negative, HER 2 negative at IHC 0, Ki-67 60%, 2 mm DCIS, gr 3, 0/3 LN, pT1b pN0 cM0    [employer]ae genetic testing:  Negative    TC 2/18/20-4/21/2020    S/p XRT 6/28/20          History of Present Illness:     An abnormal mammogram led to the pathology above.      Interval history:  In today for follow up.        FH:  Paternal Great aunts (two of them), 50-60 and 60-70 years old; maternal great aunt in her 70s; no ovarian, advanced prostate cancer, or pancreas cancer      LMP 2/01/2021      Review of systems was obtained and pertinent findings reviewed above. Past medical history, social history, family history, medications, and allergies are located in the electronic medical record.         Physical Exam:        Visit Vitals     There were no vitals filed for this visit.     ECOG PS: 0   General: No distress   Eyes:  Anicteric sclerae   HENT: Atraumatic   Neck: Supple   Respiratory: Normal respiratory effort   CV: No peripheral edema   GI: nondistended   Skin: No rashes, ecchymoses, or petechiae   Psych: Alert, oriented, appropriate affect, normal judgment/insight                Results:            10/17/19 breast MRI   DCIS at 12:00 8 cmfn      11/22/21 CT chest   IMPRESSION   1. Postsurgical changes in both breasts. There is a locule of gas within the   lateral, left breast of uncertain significance. Recommend clinical correlation   for recent procedure.    2. Resolution of the previously identified lung